# Patient Record
Sex: FEMALE | Race: BLACK OR AFRICAN AMERICAN | NOT HISPANIC OR LATINO
[De-identification: names, ages, dates, MRNs, and addresses within clinical notes are randomized per-mention and may not be internally consistent; named-entity substitution may affect disease eponyms.]

---

## 2017-01-20 ENCOUNTER — MED ADMIN CHARGE (OUTPATIENT)
Age: 27
End: 2017-01-20

## 2017-01-20 ENCOUNTER — APPOINTMENT (OUTPATIENT)
Dept: OBGYN | Facility: CLINIC | Age: 27
End: 2017-01-20

## 2017-01-20 VITALS — WEIGHT: 198 LBS | SYSTOLIC BLOOD PRESSURE: 110 MMHG | DIASTOLIC BLOOD PRESSURE: 66 MMHG

## 2017-01-20 RX ORDER — MEDROXYPROGESTERONE ACETATE 150 MG/ML
150 INJECTION, SUSPENSION INTRAMUSCULAR
Qty: 0 | Refills: 0 | Status: COMPLETED | OUTPATIENT
Start: 2017-01-20

## 2017-01-20 RX ADMIN — MEDROXYPROGESTERONE ACETATE 0 MG/ML: 150 INJECTION, SUSPENSION INTRAMUSCULAR at 00:00

## 2017-04-07 ENCOUNTER — RECORD ABSTRACTING (OUTPATIENT)
Age: 27
End: 2017-04-07

## 2017-04-10 ENCOUNTER — RECORD ABSTRACTING (OUTPATIENT)
Age: 27
End: 2017-04-10

## 2017-04-14 ENCOUNTER — APPOINTMENT (OUTPATIENT)
Dept: OBGYN | Facility: CLINIC | Age: 27
End: 2017-04-14

## 2017-04-14 VITALS
HEIGHT: 62 IN | WEIGHT: 191 LBS | DIASTOLIC BLOOD PRESSURE: 62 MMHG | SYSTOLIC BLOOD PRESSURE: 110 MMHG | BODY MASS INDEX: 35.15 KG/M2

## 2017-04-14 RX ORDER — MEDROXYPROGESTERONE ACETATE 150 MG/ML
150 INJECTION, SUSPENSION INTRAMUSCULAR
Qty: 0 | Refills: 0 | Status: COMPLETED | OUTPATIENT
Start: 2017-04-14

## 2017-04-14 RX ADMIN — MEDROXYPROGESTERONE ACETATE 0 MG/ML: 150 INJECTION, SUSPENSION INTRAMUSCULAR at 00:00

## 2017-04-21 ENCOUNTER — APPOINTMENT (OUTPATIENT)
Dept: INTERNAL MEDICINE | Facility: CLINIC | Age: 27
End: 2017-04-21

## 2017-05-03 ENCOUNTER — APPOINTMENT (OUTPATIENT)
Dept: INTERNAL MEDICINE | Facility: CLINIC | Age: 27
End: 2017-05-03

## 2017-05-03 ENCOUNTER — OUTPATIENT (OUTPATIENT)
Dept: OUTPATIENT SERVICES | Facility: HOSPITAL | Age: 27
LOS: 1 days | Discharge: HOME | End: 2017-05-03

## 2017-05-03 ENCOUNTER — RESULT REVIEW (OUTPATIENT)
Age: 27
End: 2017-05-03

## 2017-05-03 VITALS
HEIGHT: 62 IN | WEIGHT: 190 LBS | SYSTOLIC BLOOD PRESSURE: 122 MMHG | HEART RATE: 105 BPM | BODY MASS INDEX: 34.96 KG/M2 | DIASTOLIC BLOOD PRESSURE: 86 MMHG

## 2017-05-04 LAB
ALBUMIN SERPL-MCNC: 3.8 G/DL
ALBUMIN/GLOB SERPL: 1.09
ALP SERPL-CCNC: 53 IU/L
ALT SERPL-CCNC: 18 IU/L
ANION GAP SERPL CALC-SCNC: 10 MEQ/L
AST SERPL-CCNC: 29 IU/L
BASOPHILS # BLD: 0.02 TH/MM3
BASOPHILS NFR BLD: 0.2 %
BILIRUB SERPL-MCNC: 0.4 MG/DL
BUN SERPL-MCNC: 7 MG/DL
BUN/CREAT SERPL: 10.3 %
CALCIUM SERPL-MCNC: 9.3 MG/DL
CHLORIDE SERPL-SCNC: 105 MEQ/L
CO2 SERPL-SCNC: 25 MEQ/L
CREAT SERPL-MCNC: 0.68 MG/DL
DIFFERENTIAL METHOD BLD: NORMAL
EOSINOPHIL # BLD: 0.36 TH/MM3
EOSINOPHIL NFR BLD: 3.4 %
ERYTHROCYTE [DISTWIDTH] IN BLOOD BY AUTOMATED COUNT: 14.9 %
GFR SERPL CREATININE-BSD FRML MDRD: 128
GLUCOSE SERPL-MCNC: 82 MG/DL
GRANULOCYTES # BLD: 7.01 TH/MM3
GRANULOCYTES NFR BLD: 66.7 %
HCT VFR BLD AUTO: 38.4 %
HGB BLD-MCNC: 12.3 G/DL
IMM GRANULOCYTES # BLD: 0.03 TH/MM3
IMM GRANULOCYTES NFR BLD: 0.3 %
LYMPHOCYTES # BLD: 2.35 TH/MM3
LYMPHOCYTES NFR BLD: 22.3 %
MCH RBC QN AUTO: 25.2 PG
MCHC RBC AUTO-ENTMCNC: 32 G/DL
MCV RBC AUTO: 78.5 FL
MONOCYTES # BLD: 0.75 TH/MM3
MONOCYTES NFR BLD: 7.1 %
PLATELET # BLD: 453 TH/MM3
PMV BLD AUTO: 10.9 FL
POTASSIUM SERPL-SCNC: 3.8 MMOL/L
PROT SERPL-MCNC: 7.3 G/DL
RBC # BLD AUTO: 4.89 MIL/MM3
SODIUM SERPL-SCNC: 140 MEQ/L
WBC # BLD: 10.52 TH/MM3

## 2017-06-28 DIAGNOSIS — J45.909 UNSPECIFIED ASTHMA, UNCOMPLICATED: ICD-10-CM

## 2017-06-28 DIAGNOSIS — J34.9 UNSPECIFIED DISORDER OF NOSE AND NASAL SINUSES: ICD-10-CM

## 2017-06-30 ENCOUNTER — MED ADMIN CHARGE (OUTPATIENT)
Age: 27
End: 2017-06-30

## 2017-06-30 ENCOUNTER — APPOINTMENT (OUTPATIENT)
Dept: OBGYN | Facility: CLINIC | Age: 27
End: 2017-06-30

## 2017-06-30 ENCOUNTER — OUTPATIENT (OUTPATIENT)
Dept: OUTPATIENT SERVICES | Facility: HOSPITAL | Age: 27
LOS: 1 days | Discharge: HOME | End: 2017-06-30

## 2017-06-30 VITALS
BODY MASS INDEX: 34.23 KG/M2 | WEIGHT: 186 LBS | SYSTOLIC BLOOD PRESSURE: 120 MMHG | DIASTOLIC BLOOD PRESSURE: 70 MMHG | HEIGHT: 62 IN

## 2017-06-30 RX ORDER — MEDROXYPROGESTERONE ACETATE 150 MG/ML
150 INJECTION, SUSPENSION INTRAMUSCULAR
Qty: 0 | Refills: 0 | Status: COMPLETED | OUTPATIENT
Start: 2017-06-30

## 2017-06-30 RX ADMIN — Medication 0 MG/ML: at 00:00

## 2017-09-22 ENCOUNTER — OUTPATIENT (OUTPATIENT)
Dept: OUTPATIENT SERVICES | Facility: HOSPITAL | Age: 27
LOS: 1 days | Discharge: HOME | End: 2017-09-22

## 2017-09-22 ENCOUNTER — APPOINTMENT (OUTPATIENT)
Dept: OBGYN | Facility: CLINIC | Age: 27
End: 2017-09-22

## 2017-09-22 VITALS — WEIGHT: 197 LBS | BODY MASS INDEX: 36.03 KG/M2 | SYSTOLIC BLOOD PRESSURE: 118 MMHG | DIASTOLIC BLOOD PRESSURE: 70 MMHG

## 2017-09-22 RX ORDER — MEDROXYPROGESTERONE ACETATE 150 MG/ML
150 INJECTION, SUSPENSION INTRAMUSCULAR
Qty: 0 | Refills: 0 | Status: COMPLETED | OUTPATIENT
Start: 2017-09-22

## 2017-09-22 RX ADMIN — MEDROXYPROGESTERONE ACETATE 0 MG/ML: 150 INJECTION, SUSPENSION INTRAMUSCULAR at 00:00

## 2017-09-28 LAB
C TRACH RRNA SPEC QL NAA+PROBE: NOT DETECTED
N GONORRHOEA RRNA SPEC QL NAA+PROBE: NOT DETECTED

## 2017-10-24 LAB — CYTOLOGY CVX/VAG DOC THIN PREP: NORMAL

## 2017-11-01 ENCOUNTER — APPOINTMENT (OUTPATIENT)
Dept: INTERNAL MEDICINE | Facility: CLINIC | Age: 27
End: 2017-11-01

## 2017-11-07 ENCOUNTER — OUTPATIENT (OUTPATIENT)
Dept: OUTPATIENT SERVICES | Facility: HOSPITAL | Age: 27
LOS: 1 days | Discharge: HOME | End: 2017-11-07

## 2017-11-07 ENCOUNTER — APPOINTMENT (OUTPATIENT)
Dept: INTERNAL MEDICINE | Facility: CLINIC | Age: 27
End: 2017-11-07

## 2017-11-07 VITALS
BODY MASS INDEX: 36.21 KG/M2 | HEART RATE: 88 BPM | WEIGHT: 198 LBS | DIASTOLIC BLOOD PRESSURE: 78 MMHG | SYSTOLIC BLOOD PRESSURE: 114 MMHG

## 2017-12-08 ENCOUNTER — APPOINTMENT (OUTPATIENT)
Dept: OBGYN | Facility: CLINIC | Age: 27
End: 2017-12-08

## 2017-12-08 ENCOUNTER — OUTPATIENT (OUTPATIENT)
Dept: OUTPATIENT SERVICES | Facility: HOSPITAL | Age: 27
LOS: 1 days | Discharge: HOME | End: 2017-12-08

## 2017-12-08 VITALS
SYSTOLIC BLOOD PRESSURE: 120 MMHG | BODY MASS INDEX: 34.97 KG/M2 | WEIGHT: 190.03 LBS | DIASTOLIC BLOOD PRESSURE: 72 MMHG | HEIGHT: 62 IN

## 2017-12-08 RX ADMIN — MEDROXYPROGESTERONE ACETATE 0 MG/ML: 150 INJECTION, SUSPENSION INTRAMUSCULAR at 00:00

## 2017-12-28 ENCOUNTER — APPOINTMENT (OUTPATIENT)
Dept: INTERNAL MEDICINE | Facility: CLINIC | Age: 27
End: 2017-12-28

## 2017-12-28 ENCOUNTER — OUTPATIENT (OUTPATIENT)
Dept: OUTPATIENT SERVICES | Facility: HOSPITAL | Age: 27
LOS: 1 days | Discharge: HOME | End: 2017-12-28

## 2017-12-28 RX ORDER — CHROMIUM 200 MCG
TABLET ORAL
Refills: 0 | Status: DISCONTINUED | COMMUNITY
End: 2017-12-28

## 2017-12-29 ENCOUNTER — APPOINTMENT (OUTPATIENT)
Dept: INTERNAL MEDICINE | Facility: CLINIC | Age: 27
End: 2017-12-29

## 2017-12-29 ENCOUNTER — OUTPATIENT (OUTPATIENT)
Dept: OUTPATIENT SERVICES | Facility: HOSPITAL | Age: 27
LOS: 1 days | Discharge: HOME | End: 2017-12-29

## 2017-12-29 LAB
BASOPHILS # BLD: 0.04 TH/MM3
BASOPHILS NFR BLD: 0.5 %
DIFFERENTIAL METHOD BLD: NORMAL
EOSINOPHIL # BLD: 0.28 TH/MM3
EOSINOPHIL NFR BLD: 3.2 %
ERYTHROCYTE [DISTWIDTH] IN BLOOD BY AUTOMATED COUNT: 14.1 %
GRANULOCYTES # BLD: 4.68 TH/MM3
GRANULOCYTES NFR BLD: 53.6 %
HCT VFR BLD AUTO: 38.5 %
HGB BLD-MCNC: 12.6 G/DL
IMM GRANULOCYTES # BLD: 0.02 TH/MM3
IMM GRANULOCYTES NFR BLD: 0.2 %
LYMPHOCYTES # BLD: 3.11 TH/MM3
LYMPHOCYTES NFR BLD: 35.7 %
MCH RBC QN AUTO: 25.8 PG
MCHC RBC AUTO-ENTMCNC: 32.7 G/DL
MCV RBC AUTO: 78.7 FL
MONOCYTES # BLD: 0.59 TH/MM3
MONOCYTES NFR BLD: 6.8 %
PLATELET # BLD: 425 TH/MM3
PMV BLD AUTO: 11.1 FL
RBC # BLD AUTO: 4.89 MIL/MM3
WBC # BLD: 8.72 TH/MM3

## 2018-01-02 DIAGNOSIS — Z23 ENCOUNTER FOR IMMUNIZATION: ICD-10-CM

## 2018-01-02 LAB
25(OH)D3 SERPL-MCNC: 8 NG/ML
GAMMA INTERFERON BACKGROUND BLD IA-ACNC: 0.01 IU/ML
M TB IFN-G BLD-IMP: NEGATIVE
M TB IFN-G CD4+ T-CELLS BLD-ACNC: 0 IU/ML
MITOGEN IGNF BLD-ACNC: 9.01 IU/ML
VITAMIN D2 SERPL-MCNC: <4 NG/ML
VITAMIN D3 SERPL-MCNC: 8 NG/ML

## 2018-01-03 ENCOUNTER — APPOINTMENT (OUTPATIENT)
Dept: INTERNAL MEDICINE | Facility: CLINIC | Age: 28
End: 2018-01-03

## 2018-01-23 ENCOUNTER — OTHER (OUTPATIENT)
Age: 28
End: 2018-01-23

## 2018-01-25 ENCOUNTER — OTHER (OUTPATIENT)
Age: 28
End: 2018-01-25

## 2018-02-11 ENCOUNTER — EMERGENCY (EMERGENCY)
Facility: HOSPITAL | Age: 28
LOS: 0 days | Discharge: HOME | End: 2018-02-11
Admitting: INTERNAL MEDICINE

## 2018-02-11 VITALS
HEART RATE: 119 BPM | OXYGEN SATURATION: 100 % | RESPIRATION RATE: 18 BRPM | TEMPERATURE: 99 F | DIASTOLIC BLOOD PRESSURE: 96 MMHG | SYSTOLIC BLOOD PRESSURE: 136 MMHG

## 2018-02-11 DIAGNOSIS — R09.81 NASAL CONGESTION: ICD-10-CM

## 2018-02-11 DIAGNOSIS — J06.9 ACUTE UPPER RESPIRATORY INFECTION, UNSPECIFIED: ICD-10-CM

## 2018-02-11 DIAGNOSIS — J45.909 UNSPECIFIED ASTHMA, UNCOMPLICATED: ICD-10-CM

## 2018-02-11 DIAGNOSIS — M79.1 MYALGIA: ICD-10-CM

## 2018-02-11 RX ORDER — IBUPROFEN 200 MG
600 TABLET ORAL ONCE
Qty: 0 | Refills: 0 | Status: COMPLETED | OUTPATIENT
Start: 2018-02-11 | End: 2018-02-11

## 2018-02-11 RX ADMIN — Medication 600 MILLIGRAM(S): at 11:38

## 2018-02-11 NOTE — ED PROVIDER NOTE - ENMT NEGATIVE STATEMENT, MLM
Ears: no ear pain and no hearing problems. Nose: + nasal congestion and nasal drainage. Mouth/Throat: no dysphagia, no hoarseness and no throat pain. Neck: no lumps, no pain, no stiffness and no swollen glands.

## 2018-02-11 NOTE — ED PROVIDER NOTE - OBJECTIVE STATEMENT
27 y/o F, PMHx Asthma, presents to the ED with complaints of nasal congestion and body-aches x four days. She states that her boyfriend was recently ill with similar symptoms. She denies odynophagia, chest pain, dyspnea, cough, nausea, vomiting, abdominal pain, back pain and recent travel. She has been taking Nyquil with moderate symptomatic improvement ; she last took yesterday.

## 2018-02-11 NOTE — ED PROVIDER NOTE - ENMT, MLM
Airway patent, Nasal turbinates inflammed b/l. Mouth with normal mucosa. Throat has no vesicles, no oropharyngeal exudates and uvula is midline.

## 2018-02-11 NOTE — ED PROVIDER NOTE - MEDICAL DECISION MAKING DETAILS
Patient advised that symptoms are likely attributable to a viral etiology. Advised on sx's prompting ED return and patient may return to the ED at any time

## 2018-03-02 ENCOUNTER — APPOINTMENT (OUTPATIENT)
Dept: OBGYN | Facility: CLINIC | Age: 28
End: 2018-03-02

## 2018-03-02 ENCOUNTER — OUTPATIENT (OUTPATIENT)
Dept: OUTPATIENT SERVICES | Facility: HOSPITAL | Age: 28
LOS: 1 days | Discharge: HOME | End: 2018-03-02

## 2018-03-02 VITALS
WEIGHT: 193.04 LBS | SYSTOLIC BLOOD PRESSURE: 118 MMHG | BODY MASS INDEX: 35.52 KG/M2 | HEIGHT: 62 IN | DIASTOLIC BLOOD PRESSURE: 64 MMHG

## 2018-03-02 RX ORDER — MEDROXYPROGESTERONE ACETATE 150 MG/ML
150 INJECTION, SUSPENSION INTRAMUSCULAR
Qty: 0 | Refills: 0 | Status: COMPLETED | OUTPATIENT
Start: 2018-03-02

## 2018-03-02 RX ADMIN — MEDROXYPROGESTERONE ACETATE 0 MG/ML: 150 INJECTION, SUSPENSION INTRAMUSCULAR at 00:00

## 2018-03-05 DIAGNOSIS — Z30.8 ENCOUNTER FOR OTHER CONTRACEPTIVE MANAGEMENT: ICD-10-CM

## 2018-04-03 ENCOUNTER — APPOINTMENT (OUTPATIENT)
Dept: DERMATOLOGY | Facility: CLINIC | Age: 28
End: 2018-04-03

## 2018-04-03 ENCOUNTER — OUTPATIENT (OUTPATIENT)
Dept: OUTPATIENT SERVICES | Facility: HOSPITAL | Age: 28
LOS: 1 days | Discharge: HOME | End: 2018-04-03

## 2018-05-02 ENCOUNTER — OUTPATIENT (OUTPATIENT)
Dept: OUTPATIENT SERVICES | Facility: HOSPITAL | Age: 28
LOS: 1 days | Discharge: HOME | End: 2018-05-02

## 2018-05-02 ENCOUNTER — APPOINTMENT (OUTPATIENT)
Dept: INTERNAL MEDICINE | Facility: CLINIC | Age: 28
End: 2018-05-02

## 2018-05-02 VITALS
BODY MASS INDEX: 36.44 KG/M2 | HEIGHT: 62 IN | DIASTOLIC BLOOD PRESSURE: 88 MMHG | SYSTOLIC BLOOD PRESSURE: 126 MMHG | WEIGHT: 198 LBS | HEART RATE: 80 BPM

## 2018-05-02 DIAGNOSIS — E55.9 VITAMIN D DEFICIENCY, UNSPECIFIED: ICD-10-CM

## 2018-05-02 DIAGNOSIS — F41.9 ANXIETY DISORDER, UNSPECIFIED: ICD-10-CM

## 2018-05-25 ENCOUNTER — OUTPATIENT (OUTPATIENT)
Dept: OUTPATIENT SERVICES | Facility: HOSPITAL | Age: 28
LOS: 1 days | Discharge: HOME | End: 2018-05-25

## 2018-05-25 ENCOUNTER — APPOINTMENT (OUTPATIENT)
Dept: OBGYN | Facility: CLINIC | Age: 28
End: 2018-05-25

## 2018-05-25 VITALS
BODY MASS INDEX: 36.91 KG/M2 | SYSTOLIC BLOOD PRESSURE: 122 MMHG | HEIGHT: 62 IN | WEIGHT: 200.56 LBS | DIASTOLIC BLOOD PRESSURE: 86 MMHG

## 2018-05-29 ENCOUNTER — EMERGENCY (EMERGENCY)
Facility: HOSPITAL | Age: 28
LOS: 0 days | Discharge: HOME | End: 2018-05-29
Admitting: INTERNAL MEDICINE

## 2018-05-29 VITALS
RESPIRATION RATE: 18 BRPM | DIASTOLIC BLOOD PRESSURE: 79 MMHG | TEMPERATURE: 98 F | HEART RATE: 107 BPM | SYSTOLIC BLOOD PRESSURE: 124 MMHG | OXYGEN SATURATION: 98 %

## 2018-05-29 VITALS — HEART RATE: 81 BPM

## 2018-05-29 DIAGNOSIS — M79.675 PAIN IN LEFT TOE(S): ICD-10-CM

## 2018-05-29 DIAGNOSIS — Z30.8 ENCOUNTER FOR OTHER CONTRACEPTIVE MANAGEMENT: ICD-10-CM

## 2018-05-29 DIAGNOSIS — S91.205A UNSPECIFIED OPEN WOUND OF LEFT LESSER TOE(S) WITH DAMAGE TO NAIL, INITIAL ENCOUNTER: ICD-10-CM
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
## 2018-05-29 LAB
25(OH)D3 SERPL-MCNC: 9 NG/ML
BASOPHILS # BLD AUTO: 0.03 K/UL
BASOPHILS NFR BLD AUTO: 0.3 %
EOSINOPHIL # BLD AUTO: 0.29 K/UL
EOSINOPHIL NFR BLD AUTO: 3.4 %
HCT VFR BLD CALC: 37.6 %
HGB BLD-MCNC: 11.9 G/DL
IMM GRANULOCYTES NFR BLD AUTO: 0.2 %
LYMPHOCYTES # BLD AUTO: 2.79 K/UL
LYMPHOCYTES NFR BLD AUTO: 32.5 %
MAN DIFF?: NORMAL
MCHC RBC-ENTMCNC: 25 PG
MCHC RBC-ENTMCNC: 31.6 G/DL
MCV RBC AUTO: 79 FL
MONOCYTES # BLD AUTO: 0.9 K/UL
MONOCYTES NFR BLD AUTO: 10.5 %
NEUTROPHILS # BLD AUTO: 4.55 K/UL
NEUTROPHILS NFR BLD AUTO: 53.1 %
PLATELET # BLD AUTO: 465 K/UL
RBC # BLD: 4.76 M/UL
RBC # FLD: 14 %
WBC # FLD AUTO: 8.58 K/UL

## 2018-05-29 NOTE — ED PROVIDER NOTE - OBJECTIVE STATEMENT
left 5th toe pain from cracked nail that occurred when jagged nail edge caught on blanket and ripped piece of nail off. Now with pain and is concerned about potential infx. Denies fever or hx of DM

## 2018-05-29 NOTE — ED PROVIDER NOTE - PHYSICAL EXAMINATION
left foot: 5th toe with cracked and missing lateral portion of nail all the way down past eponychial fold. No redenss, swelling , dc or streaking or bleeding. FROM is intact

## 2018-05-29 NOTE — ED PROVIDER NOTE - NS ED ROS FT
CONST: No fever, chills or bodyaches  EYES: No pain, redness, drainage or visual changes.    SKIN: No rashes  NEURO: No headache, dizziness, paresthesias or LOC

## 2018-07-10 ENCOUNTER — OUTPATIENT (OUTPATIENT)
Dept: OUTPATIENT SERVICES | Facility: HOSPITAL | Age: 28
LOS: 1 days | Discharge: HOME | End: 2018-07-10

## 2018-07-10 ENCOUNTER — APPOINTMENT (OUTPATIENT)
Dept: DERMATOLOGY | Facility: CLINIC | Age: 28
End: 2018-07-10

## 2018-07-13 ENCOUNTER — APPOINTMENT (OUTPATIENT)
Dept: OTOLARYNGOLOGY | Facility: CLINIC | Age: 28
End: 2018-07-13

## 2018-07-17 ENCOUNTER — RX RENEWAL (OUTPATIENT)
Age: 28
End: 2018-07-17

## 2018-07-17 RX ORDER — BETAMETHASONE VALERATE 1 MG/ML
0.1 LOTION CUTANEOUS DAILY
Qty: 1 | Refills: 5 | Status: DISCONTINUED | COMMUNITY
Start: 2018-07-10 | End: 2018-07-17

## 2018-07-30 ENCOUNTER — APPOINTMENT (OUTPATIENT)
Dept: INTERNAL MEDICINE | Facility: CLINIC | Age: 28
End: 2018-07-30

## 2018-08-10 ENCOUNTER — OUTPATIENT (OUTPATIENT)
Dept: OUTPATIENT SERVICES | Facility: HOSPITAL | Age: 28
LOS: 1 days | Discharge: HOME | End: 2018-08-10

## 2018-08-10 ENCOUNTER — APPOINTMENT (OUTPATIENT)
Dept: OBGYN | Facility: CLINIC | Age: 28
End: 2018-08-10

## 2018-08-10 VITALS
DIASTOLIC BLOOD PRESSURE: 80 MMHG | BODY MASS INDEX: 36.64 KG/M2 | SYSTOLIC BLOOD PRESSURE: 110 MMHG | WEIGHT: 199.13 LBS | HEIGHT: 62 IN

## 2018-08-13 DIAGNOSIS — Z30.8 ENCOUNTER FOR OTHER CONTRACEPTIVE MANAGEMENT: ICD-10-CM

## 2018-09-02 ENCOUNTER — EMERGENCY (EMERGENCY)
Facility: HOSPITAL | Age: 28
LOS: 0 days | Discharge: HOME | End: 2018-09-02
Admitting: PHYSICIAN ASSISTANT

## 2018-09-02 VITALS
SYSTOLIC BLOOD PRESSURE: 124 MMHG | HEART RATE: 110 BPM | TEMPERATURE: 99 F | OXYGEN SATURATION: 98 % | DIASTOLIC BLOOD PRESSURE: 80 MMHG | RESPIRATION RATE: 18 BRPM

## 2018-09-02 VITALS — HEART RATE: 90 BPM

## 2018-09-02 DIAGNOSIS — Z91.048 OTHER NONMEDICINAL SUBSTANCE ALLERGY STATUS: ICD-10-CM

## 2018-09-02 DIAGNOSIS — R21 RASH AND OTHER NONSPECIFIC SKIN ERUPTION: ICD-10-CM

## 2018-09-02 RX ORDER — MOMETASONE FUROATE 1 MG/G
0 CREAM TOPICAL
Qty: 0 | Refills: 0 | COMMUNITY

## 2018-09-02 RX ORDER — TACROLIMUS/VEHICLE BASE NO.238 0.1 %
1 CREAM (GRAM) TOPICAL
Qty: 0 | Refills: 0 | COMMUNITY

## 2018-09-02 NOTE — ED PROVIDER NOTE - OBJECTIVE STATEMENT
29 y/o F, PMHx Asthma, presents to the ED with complaints of a rash x one week. Patient states that she has hx of similar rash that has been intermittent over the past several years. She has seen a dermatologist and was prescribed mometasone cream which she has however has not yet used for most recent rash. She denies any dyspnea, chest pain, nausea, vomiting, fever and chills.

## 2018-09-02 NOTE — ED ADULT NURSE NOTE - OBJECTIVE STATEMENT
Patient complains of rash to bilateral underarms, complains of "burning" to area, states she is seeing dermatologist outpatient and has prescription medication but was supposed to work today; cannot due to burning of underarms, also requesting work note

## 2018-09-13 ENCOUNTER — APPOINTMENT (OUTPATIENT)
Dept: OTOLARYNGOLOGY | Facility: CLINIC | Age: 28
End: 2018-09-13

## 2018-10-05 ENCOUNTER — APPOINTMENT (OUTPATIENT)
Dept: OBGYN | Facility: CLINIC | Age: 28
End: 2018-10-05

## 2018-11-06 ENCOUNTER — APPOINTMENT (OUTPATIENT)
Dept: DERMATOLOGY | Facility: CLINIC | Age: 28
End: 2018-11-06

## 2018-11-06 ENCOUNTER — OUTPATIENT (OUTPATIENT)
Dept: OUTPATIENT SERVICES | Facility: HOSPITAL | Age: 28
LOS: 1 days | Discharge: HOME | End: 2018-11-06

## 2018-11-06 PROBLEM — J45.909 UNSPECIFIED ASTHMA, UNCOMPLICATED: Chronic | Status: ACTIVE | Noted: 2018-09-02

## 2018-11-06 PROBLEM — R21 RASH AND OTHER NONSPECIFIC SKIN ERUPTION: Chronic | Status: ACTIVE | Noted: 2018-09-02

## 2018-11-06 PROBLEM — F41.9 ANXIETY DISORDER, UNSPECIFIED: Chronic | Status: ACTIVE | Noted: 2018-09-02

## 2018-11-09 ENCOUNTER — APPOINTMENT (OUTPATIENT)
Dept: OBGYN | Facility: CLINIC | Age: 28
End: 2018-11-09

## 2018-11-09 ENCOUNTER — OUTPATIENT (OUTPATIENT)
Dept: OUTPATIENT SERVICES | Facility: HOSPITAL | Age: 28
LOS: 1 days | Discharge: HOME | End: 2018-11-09

## 2018-11-09 VITALS
WEIGHT: 198 LBS | HEIGHT: 62 IN | DIASTOLIC BLOOD PRESSURE: 70 MMHG | BODY MASS INDEX: 36.44 KG/M2 | SYSTOLIC BLOOD PRESSURE: 130 MMHG

## 2018-11-09 DIAGNOSIS — Z82.5 FAMILY HISTORY OF ASTHMA AND OTHER CHRONIC LOWER RESPIRATORY DISEASES: ICD-10-CM

## 2018-11-10 DIAGNOSIS — Z01.419 ENCOUNTER FOR GYNECOLOGICAL EXAMINATION (GENERAL) (ROUTINE) WITHOUT ABNORMAL FINDINGS: ICD-10-CM

## 2018-11-16 DIAGNOSIS — Z30.42 ENCOUNTER FOR SURVEILLANCE OF INJECTABLE CONTRACEPTIVE: ICD-10-CM

## 2018-11-16 DIAGNOSIS — R39.15 URGENCY OF URINATION: ICD-10-CM

## 2018-11-18 LAB
A VAGINAE DNA VAG QL NAA+PROBE: NORMAL
BVAB2 DNA VAG QL NAA+PROBE: NORMAL
C KRUSEI DNA VAG QL NAA+PROBE: NEGATIVE
C TRACH RRNA SPEC QL NAA+PROBE: NEGATIVE
MEGA1 DNA VAG QL NAA+PROBE: NORMAL
N GONORRHOEA RRNA SPEC QL NAA+PROBE: NEGATIVE
T VAGINALIS RRNA SPEC QL NAA+PROBE: NEGATIVE

## 2018-11-28 ENCOUNTER — APPOINTMENT (OUTPATIENT)
Dept: INTERNAL MEDICINE | Facility: CLINIC | Age: 28
End: 2018-11-28

## 2018-11-28 ENCOUNTER — OUTPATIENT (OUTPATIENT)
Dept: OUTPATIENT SERVICES | Facility: HOSPITAL | Age: 28
LOS: 1 days | Discharge: HOME | End: 2018-11-28

## 2018-11-28 ENCOUNTER — LABORATORY RESULT (OUTPATIENT)
Age: 28
End: 2018-11-28

## 2018-11-28 VITALS
WEIGHT: 196.31 LBS | HEART RATE: 101 BPM | TEMPERATURE: 97.7 F | BODY MASS INDEX: 36.12 KG/M2 | DIASTOLIC BLOOD PRESSURE: 84 MMHG | SYSTOLIC BLOOD PRESSURE: 130 MMHG | HEIGHT: 62 IN

## 2018-11-28 DIAGNOSIS — Z87.09 PERSONAL HISTORY OF OTHER DISEASES OF THE RESPIRATORY SYSTEM: ICD-10-CM

## 2018-11-28 RX ORDER — CHROMIUM 200 MCG
1000 TABLET ORAL DAILY
Qty: 30 | Refills: 5 | Status: DISCONTINUED | COMMUNITY
Start: 2017-12-28 | End: 2018-11-28

## 2018-11-28 RX ORDER — POLYETHYLENE GLYCOL 3350 17 G/17G
17 POWDER, FOR SOLUTION ORAL DAILY
Qty: 1 | Refills: 3 | Status: DISCONTINUED | COMMUNITY
Start: 2017-05-03 | End: 2018-11-28
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
## 2018-11-28 NOTE — ASSESSMENT
[FreeTextEntry1] : 28F with PMHx of asthma and sickle cell trait here for follow up visit. \par \par #Mild persistent Asthma \par - takes proventil PRN - refilled\par - has been taking several times per week, symptoms are worse for her in the winter \par - will advise to start symbicort \par - will refer to pulmonology and check PFT \par \par # Anxiety\par - follows with psychiatry\par - advised to bring in her medications at next visit \par \par #contact dermatitis \par - resolved\par \par # Vit D deficiency\par - still has low vitamin D \par - c/w supplementation - will refill \par \par # HCM\par - Pap smear uptodate\par - on birth control as well\par - flu shot to give today \par \par \par

## 2018-11-29 DIAGNOSIS — F41.9 ANXIETY DISORDER, UNSPECIFIED: ICD-10-CM

## 2018-11-29 DIAGNOSIS — Z23 ENCOUNTER FOR IMMUNIZATION: ICD-10-CM

## 2018-11-29 DIAGNOSIS — E55.9 VITAMIN D DEFICIENCY, UNSPECIFIED: ICD-10-CM

## 2018-11-29 DIAGNOSIS — J45.909 UNSPECIFIED ASTHMA, UNCOMPLICATED: ICD-10-CM

## 2018-11-29 LAB
25(OH)D3 SERPL-MCNC: 9 NG/ML
ALBUMIN SERPL ELPH-MCNC: 4.2 G/DL
ALP BLD-CCNC: 58 U/L
ALT SERPL-CCNC: 10 U/L
ANION GAP SERPL CALC-SCNC: 15 MMOL/L
AST SERPL-CCNC: 11 U/L
BASOPHILS # BLD AUTO: 0.03 K/UL
BASOPHILS NFR BLD AUTO: 0.4 %
BILIRUB SERPL-MCNC: <0.2 MG/DL
BUN SERPL-MCNC: 8 MG/DL
CALCIUM SERPL-MCNC: 8.9 MG/DL
CHLORIDE SERPL-SCNC: 109 MMOL/L
CHOLEST SERPL-MCNC: 137 MG/DL
CHOLEST/HDLC SERPL: 3.2 RATIO
CO2 SERPL-SCNC: 20 MMOL/L
CREAT SERPL-MCNC: 0.7 MG/DL
EOSINOPHIL # BLD AUTO: 0.2 K/UL
EOSINOPHIL NFR BLD AUTO: 2.5 %
GLUCOSE SERPL-MCNC: 96 MG/DL
HCT VFR BLD CALC: 37.9 %
HDLC SERPL-MCNC: 43 MG/DL
HGB BLD-MCNC: 11.9 G/DL
IMM GRANULOCYTES NFR BLD AUTO: 0.3 %
LDLC SERPL CALC-MCNC: 84 MG/DL
LYMPHOCYTES # BLD AUTO: 2.31 K/UL
LYMPHOCYTES NFR BLD AUTO: 29.1 %
MAN DIFF?: NORMAL
MCHC RBC-ENTMCNC: 24.7 PG
MCHC RBC-ENTMCNC: 31.4 G/DL
MCV RBC AUTO: 78.6 FL
MONOCYTES # BLD AUTO: 0.69 K/UL
MONOCYTES NFR BLD AUTO: 8.7 %
NEUTROPHILS # BLD AUTO: 4.69 K/UL
NEUTROPHILS NFR BLD AUTO: 59 %
PLATELET # BLD AUTO: 493 K/UL
POTASSIUM SERPL-SCNC: 4.2 MMOL/L
PROT SERPL-MCNC: 7.5 G/DL
RBC # BLD: 4.82 M/UL
RBC # FLD: 14.4 %
SODIUM SERPL-SCNC: 144 MMOL/L
TRIGL SERPL-MCNC: 62 MG/DL
TSH SERPL-ACNC: 2.02 UIU/ML
WBC # FLD AUTO: 7.94 K/UL

## 2018-12-03 LAB
APPEARANCE: CLEAR
BACTERIA UR CULT: NORMAL
BILIRUBIN URINE: NEGATIVE
BLOOD URINE: ABNORMAL
COLOR: YELLOW
GLUCOSE QUALITATIVE U: NEGATIVE MG/DL
KETONES URINE: NEGATIVE
LEUKOCYTE ESTERASE URINE: NEGATIVE
NITRITE URINE: NEGATIVE
PH URINE: 6.5
PROTEIN URINE: NEGATIVE MG/DL
SPECIFIC GRAVITY URINE: 1.02
UROBILINOGEN URINE: 0.2 MG/DL (ref 0.2–?)

## 2019-01-04 ENCOUNTER — RESULT CHARGE (OUTPATIENT)
Age: 29
End: 2019-01-04

## 2019-01-04 ENCOUNTER — APPOINTMENT (OUTPATIENT)
Dept: UROGYNECOLOGY | Facility: CLINIC | Age: 29
End: 2019-01-04

## 2019-01-04 ENCOUNTER — OUTPATIENT (OUTPATIENT)
Dept: OUTPATIENT SERVICES | Facility: HOSPITAL | Age: 29
LOS: 1 days | Discharge: HOME | End: 2019-01-04

## 2019-01-04 VITALS
DIASTOLIC BLOOD PRESSURE: 84 MMHG | HEIGHT: 62 IN | WEIGHT: 198 LBS | SYSTOLIC BLOOD PRESSURE: 132 MMHG | BODY MASS INDEX: 36.44 KG/M2

## 2019-01-04 DIAGNOSIS — Z87.19 PERSONAL HISTORY OF OTHER DISEASES OF THE DIGESTIVE SYSTEM: ICD-10-CM

## 2019-01-04 DIAGNOSIS — Z87.42 PERSONAL HISTORY OF OTHER DISEASES OF THE FEMALE GENITAL TRACT: ICD-10-CM

## 2019-01-04 DIAGNOSIS — N31.8 OTHER NEUROMUSCULAR DYSFUNCTION OF BLADDER: ICD-10-CM

## 2019-01-04 DIAGNOSIS — N39.46 MIXED INCONTINENCE: ICD-10-CM

## 2019-01-04 DIAGNOSIS — M79.10 MYALGIA, UNSPECIFIED SITE: ICD-10-CM

## 2019-01-04 DIAGNOSIS — R35.0 FREQUENCY OF MICTURITION: ICD-10-CM

## 2019-01-04 DIAGNOSIS — K59.00 CONSTIPATION, UNSPECIFIED: ICD-10-CM

## 2019-01-04 LAB
BILIRUB UR QL STRIP: NORMAL
CLARITY UR: CLEAR
COLLECTION METHOD: NORMAL
GLUCOSE UR-MCNC: NORMAL
HCG UR QL: 0.2 EU/DL
HGB UR QL STRIP.AUTO: NORMAL
KETONES UR-MCNC: NORMAL
LEUKOCYTE ESTERASE UR QL STRIP: NORMAL
NITRITE UR QL STRIP: NORMAL
PH UR STRIP: 6
PROT UR STRIP-MCNC: NORMAL
SP GR UR STRIP: 1

## 2019-01-04 RX ORDER — MEDROXYPROGESTERONE ACETATE 150 MG/ML
150 INJECTION, SUSPENSION INTRAMUSCULAR
Refills: 0 | Status: DISCONTINUED | COMMUNITY
End: 2019-01-04

## 2019-01-09 LAB — BACTERIA UR CULT: NORMAL

## 2019-01-11 ENCOUNTER — APPOINTMENT (OUTPATIENT)
Dept: OBGYN | Facility: CLINIC | Age: 29
End: 2019-01-11

## 2019-01-11 ENCOUNTER — OUTPATIENT (OUTPATIENT)
Dept: OUTPATIENT SERVICES | Facility: HOSPITAL | Age: 29
LOS: 1 days | Discharge: HOME | End: 2019-01-11

## 2019-01-11 VITALS — DIASTOLIC BLOOD PRESSURE: 68 MMHG | SYSTOLIC BLOOD PRESSURE: 122 MMHG | BODY MASS INDEX: 37.29 KG/M2 | WEIGHT: 203.9 LBS

## 2019-01-11 RX ORDER — MEDROXYPROGESTERONE ACETATE 150 MG/ML
150 INJECTION, SUSPENSION INTRAMUSCULAR
Qty: 0 | Refills: 0 | Status: COMPLETED | OUTPATIENT
Start: 2019-01-11

## 2019-01-11 NOTE — HISTORY OF PRESENT ILLNESS
[___ Month(s) Ago] : [unfilled] month(s) ago [Good] : being in good health [Reproductive Age] : is of reproductive age [Contraception] : uses contraception [DepoProvera] : uses depo-medroxyprogesterone

## 2019-01-15 ENCOUNTER — APPOINTMENT (OUTPATIENT)
Dept: OBGYN | Facility: CLINIC | Age: 29
End: 2019-01-15

## 2019-01-15 DIAGNOSIS — Z30.42 ENCOUNTER FOR SURVEILLANCE OF INJECTABLE CONTRACEPTIVE: ICD-10-CM

## 2019-01-15 DIAGNOSIS — Z23 ENCOUNTER FOR IMMUNIZATION: ICD-10-CM

## 2019-01-22 ENCOUNTER — APPOINTMENT (OUTPATIENT)
Dept: PULMONOLOGY | Facility: CLINIC | Age: 29
End: 2019-01-22

## 2019-01-25 ENCOUNTER — APPOINTMENT (OUTPATIENT)
Dept: OBGYN | Facility: CLINIC | Age: 29
End: 2019-01-25

## 2019-02-13 ENCOUNTER — OUTPATIENT (OUTPATIENT)
Dept: OUTPATIENT SERVICES | Facility: HOSPITAL | Age: 29
LOS: 1 days | Discharge: HOME | End: 2019-02-13

## 2019-02-13 ENCOUNTER — APPOINTMENT (OUTPATIENT)
Dept: UROGYNECOLOGY | Facility: CLINIC | Age: 29
End: 2019-02-13

## 2019-02-13 VITALS
BODY MASS INDEX: 37.36 KG/M2 | DIASTOLIC BLOOD PRESSURE: 74 MMHG | HEIGHT: 62 IN | WEIGHT: 203 LBS | SYSTOLIC BLOOD PRESSURE: 122 MMHG

## 2019-02-13 NOTE — PHYSICAL EXAM
[No Acute Distress] : in no acute distress [Well developed] : well developed [Well Nourished] : ~L well nourished

## 2019-02-13 NOTE — DISCUSSION/SUMMARY
[FreeTextEntry1] : \par Mixed Incontinence-\par Patient happy with Trospium mg BID.\par Refills provided. 90 days supply with 1 refill.\par Precautions reviewed.\par Will return in 6 months for follow up or earlier if she has any issues.\par  \par Myalgia-\par Patient happy with Flexeril 5 mg at bedtime.\par Refills provided. 90 days supply with 1 refill.\par Precautions reviewed.\par Will return in 6 months for follow up or earlier if she has any issues.\par  \par Urinary Frequency-\par Patient happy with Hydroxyzine 25 mg at bedtime.\par Refills provided. 90 days supply with 1 refill.\par Precautions reviewed.\par Will return in 6 months for follow up or earlier if she has any issues.\par  \par Constipation:\par Advised to follow our recommended bowel recipe to control your constipation.

## 2019-02-13 NOTE — COUNSELING
[FreeTextEntry1] : \par Please continue taking Trospium 20 mg for frequency. Refills sent to your pharmacy.\par \par Please continue taking Flexeril 5 mg for muscle pain. Refills sent to your pharmacy.\par \par Please continue taking Hydroxyzine 25 mg for frequency. Refills sent to your pharmacy.\par \par Please call my office if you have any issues with the cost or side effects of the medication. \par \par Schedule a 6 months follow up med check appointment with GREGG Sow.

## 2019-02-19 DIAGNOSIS — R35.0 FREQUENCY OF MICTURITION: ICD-10-CM

## 2019-02-19 DIAGNOSIS — K59.00 CONSTIPATION, UNSPECIFIED: ICD-10-CM

## 2019-02-19 DIAGNOSIS — N39.46 MIXED INCONTINENCE: ICD-10-CM

## 2019-02-19 DIAGNOSIS — M79.10 MYALGIA, UNSPECIFIED SITE: ICD-10-CM

## 2019-03-28 ENCOUNTER — APPOINTMENT (OUTPATIENT)
Dept: OTOLARYNGOLOGY | Facility: CLINIC | Age: 29
End: 2019-03-28

## 2019-04-06 ENCOUNTER — EMERGENCY (EMERGENCY)
Facility: HOSPITAL | Age: 29
LOS: 0 days | Discharge: HOME | End: 2019-04-06
Admitting: PHYSICIAN ASSISTANT
Payer: MEDICAID

## 2019-04-06 VITALS
DIASTOLIC BLOOD PRESSURE: 86 MMHG | OXYGEN SATURATION: 97 % | HEIGHT: 62 IN | WEIGHT: 195.11 LBS | RESPIRATION RATE: 18 BRPM | HEART RATE: 94 BPM | TEMPERATURE: 97 F | SYSTOLIC BLOOD PRESSURE: 124 MMHG

## 2019-04-06 DIAGNOSIS — Z79.899 OTHER LONG TERM (CURRENT) DRUG THERAPY: ICD-10-CM

## 2019-04-06 DIAGNOSIS — S63.502A UNSPECIFIED SPRAIN OF LEFT WRIST, INITIAL ENCOUNTER: ICD-10-CM

## 2019-04-06 DIAGNOSIS — Z91.048 OTHER NONMEDICINAL SUBSTANCE ALLERGY STATUS: ICD-10-CM

## 2019-04-06 DIAGNOSIS — F41.9 ANXIETY DISORDER, UNSPECIFIED: ICD-10-CM
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
## 2019-04-06 DIAGNOSIS — J45.909 UNSPECIFIED ASTHMA, UNCOMPLICATED: ICD-10-CM
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
## 2019-04-06 DIAGNOSIS — H93.8X1 OTHER SPECIFIED DISORDERS OF RIGHT EAR: ICD-10-CM
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
## 2019-04-06 DIAGNOSIS — Z88.8 ALLERGY STATUS TO OTHER DRUGS, MEDICAMENTS AND BIOLOGICAL SUBSTANCES STATUS: ICD-10-CM

## 2019-04-06 DIAGNOSIS — M25.539 PAIN IN UNSPECIFIED WRIST: ICD-10-CM
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
## 2019-04-06 PROCEDURE — 73110 X-RAY EXAM OF WRIST: CPT | Mod: 26,LT

## 2019-04-06 PROCEDURE — 99283 EMERGENCY DEPT VISIT LOW MDM: CPT | Mod: 25
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
## 2019-04-06 NOTE — ED PROVIDER NOTE - NS ED ROS FT
CONST: No fever, chills or bodyaches  EYES: No pain, redness, drainage or visual changes.  ENT: R ear "popping"  CARD: No chest pain, palpitations  RESP: No SOB, cough  MS: L wrist pain  SKIN: No rashes

## 2019-04-06 NOTE — ED PROVIDER NOTE - PHYSICAL EXAMINATION
CONST: Well appearing in NAD  EYES: PERRL, EOMI, Sclera and conjunctiva clear.   ENT: No nasal discharge. TM's clear B/L without drainage. Oropharynx normal appearing, no erythema or exudates. Uvula midline.  CARD: Normal S1 S2; Normal rate and rhythm  RESP: Equal BS B/L, No wheezes, rhonchi or rales. No distress  MS: Mild tenderness to L ulnar styloid. No deformity, swelling. FROM to digits and wrist  SKIN: Warm, dry, no acute rashes. Good turgor

## 2019-04-06 NOTE — ED PROVIDER NOTE - CARE PLAN
Principal Discharge DX:	Wrist sprain, left, initial encounter  Secondary Diagnosis:	Ear popping, right

## 2019-04-06 NOTE — ED PROVIDER NOTE - NSFOLLOWUPINSTRUCTIONS_ED_ALL_ED_FT
Wrist Sprain, Adult  A wrist sprain is a stretch or tear in the strong, fibrous tissues (ligaments) that connect your wrist bones. There are three types of wrist sprains:    Grade 1. In this type of sprain, the ligament is stretched more than normal.  Grade 2. In this type of sprain, the ligament is partially torn. You may be able to move your wrist, but not very much.  Grade 3. In this type of sprain, the ligament or muscle is completely torn. You may find it difficult or extremely painful to move your wrist even a little.    What are the causes?  A wrist sprain can be caused by using the wrist too much during sports, exercise, or at work. It can also happen with a fall or during an accident.    What increases the risk?  This condition is more likely to occur in people:    With a previous wrist or arm injury.  With poor wrist strength and flexibility.  Who play contact sports, such as football or soccer.  Who play sports that may result in a fall, such as skateboarding, biking, skiing, or snowboarding.  Who do not exercise regularly.  Who use exercise equipment that does not fit well.    What are the signs or symptoms?  Symptoms of this condition include:    Pain in the wrist, arm, or hand.  Swelling or bruised skin near the wrist, hand, or arm. The skin may look yellow or kind of blue.  Stiffness or trouble moving the hand.  Hearing a pop or feeling a tear at the time of the injury.  A warm feeling in the skin around the wrist.    How is this diagnosed?  This condition is diagnosed with a physical exam. Sometimes an X-ray is taken to make sure a bone did not break. If your health care provider thinks that you tore a ligament, he or she may order an MRI of your wrist.    How is this treated?  This condition is treated by resting and applying ice to your wrist. Additional treatment may include:    Medicine for pain and inflammation.  A splint to keep your wrist still (immobilized).  Exercises to strengthen and stretch your wrist.  Surgery. This may be done if the ligament is completely torn.    Follow these instructions at home:  If you have a splint:     Image   Do not put pressure on any part of the splint until it is fully hardened. This may take several hours.  Wear the splint as told by your health care provider. Remove it only as told by your health care provider.  Loosen the splint if your fingers tingle, become numb, or turn cold and blue.  If your splint is not waterproof:    Do not let it get wet.  Cover it with a watertight covering when you take a bath or a shower.    Keep the splint clean.  Managing pain, stiffness, and swelling     Image   If directed, put ice on the injured area.    If you have a removable splint, remove it as told by your health care provider.  Put ice in a plastic bag.  Place a towel between your skin and the bag or between the splint and the bag.  Leave the ice on for 20 minutes, 2–3 times per day.    Move your fingers often to avoid stiffness and to lessen swelling.  Raise (elevate) the injured area above the level of your heart while you are sitting or lying down.  Activity     Rest your wrist. Do not do things that cause pain.  Return to your normal activities as told by your health care provider. Ask your health care provider what activities are safe for you.  Do exercises as told by your health care provider.  General instructions     Take over-the-counter and prescription medicines only as told by your health care provider.  Do not use any products that contain nicotine or tobacco, such as cigarettes and e-cigarettes. These can delay healing. If you need help quitting, ask your health care provider.  Ask your health care provider when it is safe to drive if you have a splint.  Keep all follow-up visits as told by your health care provider. This is important.  Contact a health care provider if:  Your pain, bruising, or swelling gets worse.  Your skin becomes red, gets a rash, or has open sores.  Your pain does not get better or it gets worse.  Get help right away if:  You have a new or sudden sharp pain in the hand, arm, or wrist.  You have tingling or numbness in your hand.  Your fingers turn white, very red, or cold and blue.  You cannot move your fingers.  This information is not intended to replace advice given to you by your health care provider. Make sure you discuss any questions you have with your health care provider.
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
## 2019-05-10 ENCOUNTER — APPOINTMENT (OUTPATIENT)
Dept: OBGYN | Facility: CLINIC | Age: 29
End: 2019-05-10
Payer: MEDICAID

## 2019-05-17 ENCOUNTER — APPOINTMENT (OUTPATIENT)
Dept: OBGYN | Facility: CLINIC | Age: 29
End: 2019-05-17
Payer: MEDICAID

## 2019-05-17 ENCOUNTER — OUTPATIENT (OUTPATIENT)
Dept: OUTPATIENT SERVICES | Facility: HOSPITAL | Age: 29
LOS: 1 days | Discharge: HOME | End: 2019-05-17

## 2019-05-17 ENCOUNTER — RESULT CHARGE (OUTPATIENT)
Age: 29
End: 2019-05-17

## 2019-05-17 ENCOUNTER — APPOINTMENT (OUTPATIENT)
Dept: PULMONOLOGY | Facility: CLINIC | Age: 29
End: 2019-05-17

## 2019-05-17 VITALS — SYSTOLIC BLOOD PRESSURE: 120 MMHG | BODY MASS INDEX: 37.68 KG/M2 | WEIGHT: 206 LBS | DIASTOLIC BLOOD PRESSURE: 80 MMHG

## 2019-05-17 VITALS
WEIGHT: 203 LBS | HEIGHT: 62 IN | HEART RATE: 94 BPM | OXYGEN SATURATION: 98 % | TEMPERATURE: 97.9 F | BODY MASS INDEX: 37.36 KG/M2 | SYSTOLIC BLOOD PRESSURE: 119 MMHG | DIASTOLIC BLOOD PRESSURE: 84 MMHG

## 2019-05-17 DIAGNOSIS — J45.909 UNSPECIFIED ASTHMA, UNCOMPLICATED: ICD-10-CM

## 2019-05-17 PROCEDURE — 99201 OFFICE OUTPATIENT NEW 10 MINUTES: CPT

## 2019-05-17 NOTE — HISTORY OF PRESENT ILLNESS
[FreeTextEntry1] : 28F with sickle cell trait and asthma presents to clinic for initial eval for asthma. She reports poor compliance to symbicort (last time refilled in 2018) and uses proair 20 times a month. She has insomnia and anxiety/depression as well. Denies chest pain, SOB, wheezing. \par Has pets at home, a lot of animals and she does not have allergy symptoms when she touches her animals, but other people's animals.\par She was prescribed symbicort first by an allergy specialist for uncontrolled asthma. \par  \par

## 2019-05-17 NOTE — PHYSICAL EXAM
[General Appearance - Well Developed] : well developed [Normal Appearance] : normal appearance [Well Groomed] : well groomed [General Appearance - In No Acute Distress] : no acute distress [No Deformities] : no deformities [General Appearance - Well Nourished] : well nourished [II] : II [Heart Sounds] : normal S1 and S2 [Heart Rate And Rhythm] : heart rate and rhythm were normal [Murmurs] : no murmurs present [Respiration, Rhythm And Depth] : normal respiratory rhythm and effort [Exaggerated Use Of Accessory Muscles For Inspiration] : no accessory muscle use [Auscultation Breath Sounds / Voice Sounds] : lungs were clear to auscultation bilaterally [Abdomen Soft] : soft [Abdomen Tenderness] : non-tender [Abdomen Mass (___ Cm)] : no abdominal mass palpated [Nail Clubbing] : no clubbing of the fingernails [Cyanosis, Localized] : no localized cyanosis [Petechial Hemorrhages (___cm)] : no petechial hemorrhages [] : the neck was supple

## 2019-05-17 NOTE — END OF VISIT
[] : Resident [Time Spent: ___ minutes] : I have spent [unfilled] minutes of face to face time with the patient [FreeTextEntry3] : concur with the above assessment and plan

## 2019-05-17 NOTE — ASSESSMENT
[FreeTextEntry1] : 28 yo female PMHx asthma presented for evaluation.\par \par # Asthma:\par - requested to bring PFT results next visit.\par - refill symbicort and proair.\par - CXR/ PFTs\par \par rto in 2 months

## 2019-05-20 DIAGNOSIS — Z23 ENCOUNTER FOR IMMUNIZATION: ICD-10-CM

## 2019-05-20 DIAGNOSIS — Z30.42 ENCOUNTER FOR SURVEILLANCE OF INJECTABLE CONTRACEPTIVE: ICD-10-CM

## 2019-05-23 LAB
HCG SERPL-MCNC: <0.6 MIU/ML
HCG UR QL: NEGATIVE

## 2019-05-28 ENCOUNTER — RX RENEWAL (OUTPATIENT)
Age: 29
End: 2019-05-28

## 2019-05-28 ENCOUNTER — APPOINTMENT (OUTPATIENT)
Dept: INTERNAL MEDICINE | Facility: CLINIC | Age: 29
End: 2019-05-28

## 2019-05-28 ENCOUNTER — EMERGENCY (EMERGENCY)
Facility: HOSPITAL | Age: 29
LOS: 0 days | Discharge: HOME | End: 2019-05-28
Admitting: EMERGENCY MEDICINE
Payer: MEDICAID

## 2019-05-28 VITALS
RESPIRATION RATE: 18 BRPM | SYSTOLIC BLOOD PRESSURE: 129 MMHG | DIASTOLIC BLOOD PRESSURE: 74 MMHG | HEART RATE: 118 BPM | TEMPERATURE: 98 F | OXYGEN SATURATION: 99 %

## 2019-05-28 VITALS — HEART RATE: 98 BPM

## 2019-05-28 DIAGNOSIS — Z79.52 LONG TERM (CURRENT) USE OF SYSTEMIC STEROIDS: ICD-10-CM

## 2019-05-28 DIAGNOSIS — J45.909 UNSPECIFIED ASTHMA, UNCOMPLICATED: ICD-10-CM

## 2019-05-28 DIAGNOSIS — Z86.59 PERSONAL HISTORY OF OTHER MENTAL AND BEHAVIORAL DISORDERS: ICD-10-CM

## 2019-05-28 DIAGNOSIS — Z91.048 OTHER NONMEDICINAL SUBSTANCE ALLERGY STATUS: ICD-10-CM

## 2019-05-28 DIAGNOSIS — Z79.899 OTHER LONG TERM (CURRENT) DRUG THERAPY: ICD-10-CM

## 2019-05-28 DIAGNOSIS — L29.9 PRURITUS, UNSPECIFIED: ICD-10-CM

## 2019-05-28 DIAGNOSIS — L98.9 DISORDER OF THE SKIN AND SUBCUTANEOUS TISSUE, UNSPECIFIED: ICD-10-CM

## 2019-05-28 PROCEDURE — 99283 EMERGENCY DEPT VISIT LOW MDM: CPT

## 2019-05-28 NOTE — ED PROVIDER NOTE - OBJECTIVE STATEMENT
30 y/o female with hx Asthma, Skin sensitivity presents to the ED c/o "I have a flare up of my skin sensitivity in my armpits. It might have been my deoderant. I need a note for work." no sob/ difficulty swallowing

## 2019-08-09 ENCOUNTER — OUTPATIENT (OUTPATIENT)
Dept: OUTPATIENT SERVICES | Facility: HOSPITAL | Age: 29
LOS: 1 days | Discharge: HOME | End: 2019-08-09

## 2019-08-09 ENCOUNTER — APPOINTMENT (OUTPATIENT)
Dept: OBGYN | Facility: CLINIC | Age: 29
End: 2019-08-09
Payer: MEDICAID

## 2019-08-09 VITALS
HEIGHT: 62 IN | SYSTOLIC BLOOD PRESSURE: 122 MMHG | WEIGHT: 199 LBS | DIASTOLIC BLOOD PRESSURE: 80 MMHG | BODY MASS INDEX: 36.62 KG/M2

## 2019-08-09 PROCEDURE — 99212 OFFICE O/P EST SF 10 MIN: CPT

## 2019-08-09 RX ORDER — MEDROXYPROGESTERONE ACETATE 150 MG/ML
150 INJECTION, SUSPENSION INTRAMUSCULAR
Qty: 0 | Refills: 0 | Status: COMPLETED | OUTPATIENT
Start: 2019-08-09

## 2019-08-09 RX ADMIN — MEDROXYPROGESTERONE ACETATE 0 MG/ML: 150 INJECTION, SUSPENSION INTRAMUSCULAR at 00:00

## 2019-08-09 NOTE — PHYSICAL EXAM
[Awake] : awake [Alert] : alert [Oriented x3] : oriented to person, place, and time [Acute Distress] : no acute distress [Depressed Mood] : not depressed

## 2019-08-13 DIAGNOSIS — Z30.42 ENCOUNTER FOR SURVEILLANCE OF INJECTABLE CONTRACEPTIVE: ICD-10-CM

## 2019-09-18 ENCOUNTER — APPOINTMENT (OUTPATIENT)
Dept: UROGYNECOLOGY | Facility: CLINIC | Age: 29
End: 2019-09-18
Payer: MEDICAID

## 2019-09-18 ENCOUNTER — OUTPATIENT (OUTPATIENT)
Dept: OUTPATIENT SERVICES | Facility: HOSPITAL | Age: 29
LOS: 1 days | Discharge: HOME | End: 2019-09-18

## 2019-09-18 VITALS
SYSTOLIC BLOOD PRESSURE: 124 MMHG | BODY MASS INDEX: 36.62 KG/M2 | DIASTOLIC BLOOD PRESSURE: 72 MMHG | WEIGHT: 199 LBS | HEIGHT: 62 IN

## 2019-09-18 PROCEDURE — ZZZZZ: CPT

## 2019-09-18 RX ORDER — TROSPIUM CHLORIDE 20 MG/1
20 TABLET, FILM COATED ORAL
Qty: 180 | Refills: 1 | Status: DISCONTINUED | COMMUNITY
Start: 2019-01-04 | End: 2019-09-18

## 2019-09-18 NOTE — COUNSELING
[FreeTextEntry1] : \par If you feel like you have an infection it is important for you to call our office and we will arrange testing of your urine.\par \par Please continue taking Flexeril 5 mg for muscle pain. Refills sent to your pharmacy.\par \par Please continue taking Hydroxyzine 25 mg for frequency. Refills sent to your pharmacy.\par \par Please begin taking Trospium XL 60 mg, once a day for frequency. \par \par Please call my office if you have any issues with the cost or side effects of the medication. \par \par Schedule a 1 year follow up med check appointment with GREGG Sow.

## 2019-09-18 NOTE — HISTORY OF PRESENT ILLNESS
[FreeTextEntry1] : \par Patient is here for 6 months med check for urge incontinence.\par \par Gabapentin 100 mg TID/PRN for anxiety\par Trospium 20 mg BID urge incontinence\par Flexeril 5 mg at bedtime for myalgia\par Hydroxyzine 25 mg at bedtime for urinary frequency\par \par Today, patient states she is happy with Trospium 20 mg BID, Flexeril 5 mg, and Hydroxyzine 25 mg and is noticing improvement. Patient only remembers to take Trospium 20 mg once a day. Denies side effects. Patient does not feel she has an infection.\par \par Patient would like to take Trospium once a day and continue taking Flexeril 5 mg and Hydroxyzine 25 mg at bedtime.

## 2019-09-18 NOTE — DISCUSSION/SUMMARY
[FreeTextEntry1] : \par Mixed Incontinence-\par Prescribed Trospium XL 60 mg daily.\par 90 days supply with 3 refills.\par Precautions reviewed.\par Will return in 1 year for follow up or earlier if she has any issues.\par \par Myalgia-\par Patient happy with Flexeril 5 mg at bedtime.\par Refills provided. 90 days supply with 3 refills.\par Precautions reviewed.\par Will return in 1 year for follow up or earlier if she has any issues.\par \par Urinary Frequency-\par Patient happy with Hydroxyzine 25 mg at bedtime.\par Refills provided. 90 days supply with 3 refills.\par Precautions reviewed.\par Will return in 1 year for follow up or earlier if she has any issues.

## 2019-09-26 DIAGNOSIS — R35.0 FREQUENCY OF MICTURITION: ICD-10-CM

## 2019-09-26 DIAGNOSIS — N39.46 MIXED INCONTINENCE: ICD-10-CM

## 2019-09-26 DIAGNOSIS — M79.18 MYALGIA, OTHER SITE: ICD-10-CM

## 2019-10-25 ENCOUNTER — OUTPATIENT (OUTPATIENT)
Dept: OUTPATIENT SERVICES | Facility: HOSPITAL | Age: 29
LOS: 1 days | Discharge: HOME | End: 2019-10-25

## 2019-10-25 ENCOUNTER — APPOINTMENT (OUTPATIENT)
Dept: OBGYN | Facility: CLINIC | Age: 29
End: 2019-10-25
Payer: MEDICAID

## 2019-10-25 VITALS
BODY MASS INDEX: 36.87 KG/M2 | WEIGHT: 201.56 LBS | SYSTOLIC BLOOD PRESSURE: 120 MMHG | DIASTOLIC BLOOD PRESSURE: 80 MMHG

## 2019-10-25 PROCEDURE — 99213 OFFICE O/P EST LOW 20 MIN: CPT

## 2019-10-25 NOTE — COUNSELING
[Nutrition] : nutrition [Exercise] : exercise [Breast Self Exam] : breast self exam [Vitamins/Supplements] : vitamins/supplements

## 2019-10-29 DIAGNOSIS — Z30.42 ENCOUNTER FOR SURVEILLANCE OF INJECTABLE CONTRACEPTIVE: ICD-10-CM

## 2019-11-15 ENCOUNTER — APPOINTMENT (OUTPATIENT)
Dept: OBGYN | Facility: CLINIC | Age: 29
End: 2019-11-15

## 2019-11-22 ENCOUNTER — APPOINTMENT (OUTPATIENT)
Dept: INTERNAL MEDICINE | Facility: CLINIC | Age: 29
End: 2019-11-22
Payer: MEDICAID

## 2019-11-22 ENCOUNTER — OUTPATIENT (OUTPATIENT)
Dept: OUTPATIENT SERVICES | Facility: HOSPITAL | Age: 29
LOS: 1 days | Discharge: HOME | End: 2019-11-22

## 2019-11-22 VITALS
DIASTOLIC BLOOD PRESSURE: 78 MMHG | HEIGHT: 62 IN | BODY MASS INDEX: 38.09 KG/M2 | WEIGHT: 207 LBS | SYSTOLIC BLOOD PRESSURE: 110 MMHG | HEART RATE: 125 BPM

## 2019-11-22 DIAGNOSIS — E55.9 VITAMIN D DEFICIENCY, UNSPECIFIED: ICD-10-CM

## 2019-11-22 DIAGNOSIS — D57.3 SICKLE-CELL TRAIT: ICD-10-CM

## 2019-11-22 PROCEDURE — 99214 OFFICE O/P EST MOD 30 MIN: CPT | Mod: GC

## 2019-11-22 NOTE — ASSESSMENT
[FreeTextEntry1] : 28F with PMHx of asthma and sickle cell trait here for follow up visit. \par \par #Mild persistent Asthma \par - symbicort PRN noncompliant\par - pulmonology referral for PFT\par - allergist referral\par \par # Recurrent epistaxis:\par ENT exam WNL \par last epistaxis 6 months back\par refer to ENT\par \par # Anxiety\par - follows with psychiatry\par - advised to bring in her medications at next visit \par \par # Vit D deficiency\par - continue with meds\par - lab ordered\par \par # Urge incontinence:\par f/u with Urology\par stable on meds\par \par # HCM\par - Pap smear uptodate\par - recently had Depot progesterone\par - flu shot to given today \par \par \par

## 2019-11-22 NOTE — REVIEW OF SYSTEMS
[Negative] : Neurological [Anxiety] : anxiety [FreeTextEntry4] : occasional epistaxis [FreeTextEntry6] : see hpi [de-identified] : see hpi

## 2019-11-22 NOTE — HISTORY OF PRESENT ILLNESS
[de-identified] : 28 F with sickle cell trait and intermittent asthma presents to clinic for follow up visit.\par no new complaints \par requests medication refill\par follows up with gyn for Depot injection\par follows up with urology for urge incontinence on medication\par follows up with Psych for anxiety and PTSD\par also reports that she gets recurrent epistaxis once in a while; last episode was 6 months back.

## 2019-11-25 DIAGNOSIS — E66.9 OBESITY, UNSPECIFIED: ICD-10-CM

## 2019-11-25 DIAGNOSIS — Z00.00 ENCOUNTER FOR GENERAL ADULT MEDICAL EXAMINATION WITHOUT ABNORMAL FINDINGS: ICD-10-CM

## 2019-11-25 DIAGNOSIS — E55.9 VITAMIN D DEFICIENCY, UNSPECIFIED: ICD-10-CM

## 2019-11-25 DIAGNOSIS — R04.0 EPISTAXIS: ICD-10-CM

## 2019-11-25 DIAGNOSIS — D57.3 SICKLE-CELL TRAIT: ICD-10-CM

## 2019-11-25 DIAGNOSIS — F41.9 ANXIETY DISORDER, UNSPECIFIED: ICD-10-CM

## 2019-12-04 ENCOUNTER — OUTPATIENT (OUTPATIENT)
Dept: OUTPATIENT SERVICES | Facility: HOSPITAL | Age: 29
LOS: 1 days | Discharge: HOME | End: 2019-12-04

## 2020-01-22 ENCOUNTER — APPOINTMENT (OUTPATIENT)
Dept: OBGYN | Facility: CLINIC | Age: 30
End: 2020-01-22
Payer: MEDICAID

## 2020-01-22 ENCOUNTER — OUTPATIENT (OUTPATIENT)
Dept: OUTPATIENT SERVICES | Facility: HOSPITAL | Age: 30
LOS: 1 days | Discharge: HOME | End: 2020-01-22

## 2020-01-22 VITALS
WEIGHT: 205 LBS | DIASTOLIC BLOOD PRESSURE: 80 MMHG | SYSTOLIC BLOOD PRESSURE: 112 MMHG | HEIGHT: 62 IN | BODY MASS INDEX: 37.73 KG/M2

## 2020-01-22 PROCEDURE — 99212 OFFICE O/P EST SF 10 MIN: CPT

## 2020-01-23 DIAGNOSIS — Z30.42 ENCOUNTER FOR SURVEILLANCE OF INJECTABLE CONTRACEPTIVE: ICD-10-CM

## 2020-02-02 ENCOUNTER — TRANSCRIPTION ENCOUNTER (OUTPATIENT)
Age: 30
End: 2020-02-02

## 2020-02-26 ENCOUNTER — OUTPATIENT (OUTPATIENT)
Dept: OUTPATIENT SERVICES | Facility: HOSPITAL | Age: 30
LOS: 1 days | Discharge: HOME | End: 2020-02-26

## 2020-03-07 ENCOUNTER — TRANSCRIPTION ENCOUNTER (OUTPATIENT)
Age: 30
End: 2020-03-07

## 2020-04-15 ENCOUNTER — APPOINTMENT (OUTPATIENT)
Dept: OBGYN | Facility: CLINIC | Age: 30
End: 2020-04-15
Payer: MEDICAID

## 2020-04-15 ENCOUNTER — OUTPATIENT (OUTPATIENT)
Dept: OUTPATIENT SERVICES | Facility: HOSPITAL | Age: 30
LOS: 1 days | Discharge: HOME | End: 2020-04-15

## 2020-04-15 VITALS — SYSTOLIC BLOOD PRESSURE: 108 MMHG | WEIGHT: 215 LBS | BODY MASS INDEX: 39.32 KG/M2 | DIASTOLIC BLOOD PRESSURE: 64 MMHG

## 2020-04-15 PROCEDURE — 99212 OFFICE O/P EST SF 10 MIN: CPT

## 2020-04-15 NOTE — END OF VISIT
[] : Resident [FreeTextEntry3] : Pt here for dmpa. concerned about weight gain. Pt counseled regarding dietary changes. Pt to rv in 3 months for annual and possible dmpa.

## 2020-04-23 ENCOUNTER — RX RENEWAL (OUTPATIENT)
Age: 30
End: 2020-04-23

## 2020-05-12 ENCOUNTER — APPOINTMENT (OUTPATIENT)
Dept: INTERNAL MEDICINE | Facility: CLINIC | Age: 30
End: 2020-05-12

## 2020-07-10 ENCOUNTER — APPOINTMENT (OUTPATIENT)
Dept: PULMONOLOGY | Facility: CLINIC | Age: 30
End: 2020-07-10

## 2020-07-15 ENCOUNTER — APPOINTMENT (OUTPATIENT)
Dept: OBGYN | Facility: CLINIC | Age: 30
End: 2020-07-15

## 2020-07-27 ENCOUNTER — TRANSCRIPTION ENCOUNTER (OUTPATIENT)
Age: 30
End: 2020-07-27

## 2020-09-16 ENCOUNTER — OUTPATIENT (OUTPATIENT)
Dept: OUTPATIENT SERVICES | Facility: HOSPITAL | Age: 30
LOS: 1 days | Discharge: HOME | End: 2020-09-16

## 2020-09-16 ENCOUNTER — APPOINTMENT (OUTPATIENT)
Dept: UROGYNECOLOGY | Facility: CLINIC | Age: 30
End: 2020-09-16
Payer: MEDICAID

## 2020-09-16 VITALS — SYSTOLIC BLOOD PRESSURE: 114 MMHG | DIASTOLIC BLOOD PRESSURE: 70 MMHG | HEIGHT: 62 IN

## 2020-09-16 PROCEDURE — 99213 OFFICE O/P EST LOW 20 MIN: CPT

## 2020-09-16 NOTE — DISCUSSION/SUMMARY
[FreeTextEntry1] : Urge/Mixed Incontinence-\par Patient happy with Trospium ER 60mg QD, Flexeril 5mg QHS, Hydroxyzine 25m QHS. \par Refills provided. 30 days supply with 11 refills.\par Precautions reviewed.\par Advised to see PCP for evaluation of palpitations. Patient voiced her understanding and agrees with the plan.\par Will return in 12 months for follow up or earlier if she has any issues.\par

## 2020-09-16 NOTE — HISTORY OF PRESENT ILLNESS
[FreeTextEntry1] : Patient is here for 1 year follow up for med check for urge incontinence.\par Last seen on 9/18/2019 for 6 months med check.\par \par Gabapentin 100 mg TID/PRN for anxiety\par s/p Trospium 20 mg BID urge incontinence: pt only took it once a day\par Flexeril 5 mg at bedtime for myalgia\par Hydroxyzine 25 mg at bedtime for urinary frequency\par Trospium ER 60mg QD\par \par Today, patient states she is happy with Trospium ER 60mg QD, Flexeril 5mg QHS and Hydroxyzine 25mg QHS. Pt reports great improved in her frequency symptoms. Denies side effects. Patient does not feel she has an infection. \par Pt c/o recent heart palpitations x 2 weeks. \par \par Patient would like to continue the above medications.\par

## 2020-09-16 NOTE — COUNSELING
[FreeTextEntry1] : If you feel like you have an infection it is important for you to call our office and we will arrange testing of your urine.\par \par Please continue taking Trospium ER 60mg once a day, Flexeril 5mg once at bedtime, Hydroxyzine 25mg once at bedtime for frequency. Refills sent to your pharmacy.\par \par Please call my office if you have any issues with the cost or side effects of the medication. \par \par Schedule a 12 months follow up med check appointment.\par

## 2020-09-30 DIAGNOSIS — N39.46 MIXED INCONTINENCE: ICD-10-CM

## 2020-09-30 DIAGNOSIS — M79.18 MYALGIA, OTHER SITE: ICD-10-CM

## 2020-10-01 ENCOUNTER — OUTPATIENT (OUTPATIENT)
Dept: OUTPATIENT SERVICES | Facility: HOSPITAL | Age: 30
LOS: 1 days | End: 2020-10-01
Payer: MEDICAID

## 2020-10-07 ENCOUNTER — EMERGENCY (EMERGENCY)
Facility: HOSPITAL | Age: 30
LOS: 0 days | Discharge: HOME | End: 2020-10-07
Attending: EMERGENCY MEDICINE | Admitting: EMERGENCY MEDICINE
Payer: MEDICAID

## 2020-10-07 VITALS
HEART RATE: 106 BPM | SYSTOLIC BLOOD PRESSURE: 153 MMHG | OXYGEN SATURATION: 100 % | WEIGHT: 214.95 LBS | HEIGHT: 62 IN | TEMPERATURE: 99 F | DIASTOLIC BLOOD PRESSURE: 103 MMHG | RESPIRATION RATE: 18 BRPM

## 2020-10-07 VITALS — HEART RATE: 98 BPM
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
## 2020-10-07 DIAGNOSIS — M25.561 PAIN IN RIGHT KNEE: ICD-10-CM

## 2020-10-07 DIAGNOSIS — M54.5 LOW BACK PAIN: ICD-10-CM
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
## 2020-10-07 DIAGNOSIS — J45.909 UNSPECIFIED ASTHMA, UNCOMPLICATED: ICD-10-CM
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
## 2020-10-07 DIAGNOSIS — Z88.8 ALLERGY STATUS TO OTHER DRUGS, MEDICAMENTS AND BIOLOGICAL SUBSTANCES: ICD-10-CM

## 2020-10-07 DIAGNOSIS — Z79.899 OTHER LONG TERM (CURRENT) DRUG THERAPY: ICD-10-CM

## 2020-10-07 DIAGNOSIS — Z86.59 PERSONAL HISTORY OF OTHER MENTAL AND BEHAVIORAL DISORDERS: ICD-10-CM

## 2020-10-07 DIAGNOSIS — M79.605 PAIN IN LEFT LEG: ICD-10-CM

## 2020-10-07 PROCEDURE — 99284 EMERGENCY DEPT VISIT MOD MDM: CPT

## 2020-10-07 RX ORDER — ACETAMINOPHEN 500 MG
975 TABLET ORAL ONCE
Refills: 0 | Status: COMPLETED | OUTPATIENT
Start: 2020-10-07 | End: 2020-10-07

## 2020-10-07 RX ADMIN — Medication 975 MILLIGRAM(S): at 09:37

## 2020-10-07 NOTE — ED PROVIDER NOTE - PATIENT PORTAL LINK FT
You can access the FollowMyHealth Patient Portal offered by [facility] by registering at the following website: [email or web address]. By joining [facility]’s FollowMyHealth portal, you will also be able to view your health information using other applications (apps) compatible with our system.
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
## 2020-10-07 NOTE — ED PROVIDER NOTE - CLINICAL SUMMARY MEDICAL DECISION MAKING FREE TEXT BOX
29 yo F presented to ED for slip and fall. Pt did not have any tenderness. Improved with tylenol. DC home

## 2020-10-07 NOTE — ED PROVIDER NOTE - NSFOLLOWUPINSTRUCTIONS_ED_ALL_ED_FT
Back Pain    Back pain is very common in adults. The cause of back pain is rarely dangerous and the pain often gets better over time. The cause of your back pain may not be known and may include strain of muscles or ligaments, degeneration of the spinal disks, or arthritis. Occasionally the pain may radiate down your leg(s). Over-the-counter medicines to reduce pain and inflammation are often the most helpful. Stretching and remaining active frequently helps the healing process.     SEEK IMMEDIATE MEDICAL CARE IF YOU HAVE THE FOLLOWING SYMPTOMS: bowel or bladder control problems, unusual weakness or numbness in your arms or legs, nausea or vomiting, abdominal pain, fever, dizziness/lightheadedness. Back Pain    Back pain is very common in adults. The cause of back pain is rarely dangerous and the pain often gets better over time. The cause of your back pain may not be known and may include strain of muscles or ligaments, degeneration of the spinal disks, or arthritis. Occasionally the pain may radiate down your leg(s). Over-the-counter medicines to reduce pain and inflammation are often the most helpful. Stretching and remaining active frequently helps the healing process.     SEEK IMMEDIATE MEDICAL CARE IF YOU HAVE THE FOLLOWING SYMPTOMS: bowel or bladder control problems, unusual weakness or numbness in your arms or legs, nausea or vomiting, abdominal pain, fever, dizziness/lightheadedness.      Leg Pain    WHAT YOU NEED TO KNOW:    Leg pain may be caused by a variety of health conditions. Your tests did not show any broken bones or blood clots.    DISCHARGE INSTRUCTIONS:    Return to the emergency department if:   •You have a fever.      •Your leg starts to swell.      •Your leg pain gets worse.      •You have numbness or tingling in your leg or toes.      •You cannot put any weight on or move your leg.      Contact your healthcare provider if:   •Your pain does not decrease, even after treatment.      •You have questions or concerns about your condition or care.      Medicines:   •NSAIDs, such as ibuprofen, help decrease swelling, pain, and fever. This medicine is available with or without a doctor's order. NSAIDs can cause stomach bleeding or kidney problems in certain people. If you take blood thinner medicine, always ask your healthcare provider if NSAIDs are safe for you. Always read the medicine label and follow directions.      •Take your medicine as directed. Contact your healthcare provider if you think your medicine is not helping or if you have side effects. Tell him of her if you are allergic to any medicine. Keep a list of the medicines, vitamins, and herbs you take. Include the amounts, and when and why you take them. Bring the list or the pill bottles to follow-up visits. Carry your medicine list with you in case of an emergency.      Follow up with your healthcare provider as directed: You may need more tests to find the cause of your leg pain. You may need to see an orthopedic specialist or a physical therapist. Write down your questions so you remember to ask them during your visits.    Manage your leg pain:   •Rest your injured leg so that it can heal. You may need an immobilizer, brace, or splint to limit the movement of your leg. You may need to avoid putting any weight on your leg for at least 48 hours. Return to normal activities as directed.      •Ice the injury for 20 minutes every 4 hours for up to 24 hours, or as directed. Use an ice pack, or put crushed ice in a plastic bag. Cover it with a towel to protect your skin. Ice helps prevent tissue damage and decreases swelling and pain.      •Elevate your injured leg above the level of your heart as often as you can. This will help decrease swelling and pain. If possible, prop your leg on pillows or blankets to keep the area elevated comfortably.       •Use assistive devices as directed. You may need to use a cane or crutches. Assistive devices help decrease pain and pressure on your leg when you walk. Ask your healthcare provider for more information about assistive devices and how to use them correctly.      •Maintain a healthy weight. Extra body weight can cause pressure and pain in your hip, knee, and ankle joints. Ask your healthcare provider how much you should weigh. Ask him to help you create a weight loss plan if you are overweight.

## 2020-10-07 NOTE — ED PROVIDER NOTE - CARE PROVIDER_API CALL
Hal Wallace  ORTHOPAEDIC SURGERY  [address] [place] [address]  Phone: [phone]  Fax: [phone]  Follow Up Time: 1-3 Days

## 2020-10-07 NOTE — ED PROVIDER NOTE - OBJECTIVE STATEMENT
29 yo F PMH overactive bladder c/o L leg shakiness and lower back pain after mechanical fall today. Pt slipped on slippery floor and fell onto L knee. Was able to get up and ambulate after. Now c/o achy L sided lower back pain and feels shaking in leg. Reports she had another fall on Monday where she fell onto R knee and went to [facility] who told her she needs MRI. No head injury or LOC. Sx mild, no alleviating or aggravating factors, no CP, SOB, abd pain, N/V/D, neck pain or urinary sx.

## 2020-10-07 NOTE — ED ADULT NURSE NOTE - OBJECTIVE STATEMENT
30 year old female complaining of lower back pain after falling today in her apartment building. patient states she fell on her left side, denies any head trauma or loc

## 2020-10-07 NOTE — ED PROVIDER NOTE - PHYSICAL EXAMINATION
VITAL SIGNS: I have reviewed nursing notes and confirm.  CONSTITUTIONAL: Well-developed; well-nourished; in no acute distress.  SKIN: Skin exam is warm and dry, no acute rash.  HEAD: Normocephalic; atraumatic.  EYES: PERRL, EOM intact; conjunctiva and sclera clear.  ENT: No nasal discharge; airway clear.   NECK: Supple; non tender.  CARD: S1, S2 normal; no murmurs, gallops, or rubs. Regular rate and rhythm.  RESP: Clear to auscultation bilaterally. No wheezes, rales or rhonchi.  ABD: Normal bowel sounds; soft; non-distended; non-tender.   EXT: Normal ROM. No edema. (+) mild R knee tenderness, L leg with no bony tenderness, DP 2+ b/l, FROM b/l LE, no midline tenderness, no c spine tenderness, ambulating with no difficulty  LYMPH: No acute cervical adenopathy.  NEURO: Alert, oriented. Grossly unremarkable. No focal deficits.  PSYCH: Cooperative, appropriate.

## 2020-10-07 NOTE — ED PROVIDER NOTE - NS ED ROS FT
Constitutional: See HPI. No fever/chills.  Eyes: No visual changes, eye pain or discharge.  ENT: No hearing changes, pain, discharge or infections.  Neck: No neck pain or stiffness.  Cardiac: No chest pain, SOB or edema. No chest pain with exertion.  Respiratory: No cough or respiratory distress. No hemoptysis.   GI: No nausea, vomiting, diarrhea or abdominal pain.  : No dysuria, frequency or burning.   MS: No myalgia, muscle weakness, or joint pain (+) back pain (+) L leg pain  Neuro: No headache or weakness. No LOC.  Skin: No rash.  Except as documented in the HPI, all other systems are negative.

## 2020-10-09 ENCOUNTER — OUTPATIENT (OUTPATIENT)
Dept: OUTPATIENT SERVICES | Facility: HOSPITAL | Age: 30
LOS: 1 days | Discharge: HOME | End: 2020-10-09
Payer: MEDICAID

## 2020-10-09 ENCOUNTER — APPOINTMENT (OUTPATIENT)
Dept: INTERNAL MEDICINE | Facility: CLINIC | Age: 30
End: 2020-10-09
Payer: MEDICAID

## 2020-10-09 VITALS
WEIGHT: 213 LBS | BODY MASS INDEX: 39.2 KG/M2 | HEIGHT: 62 IN | DIASTOLIC BLOOD PRESSURE: 83 MMHG | TEMPERATURE: 96.9 F | SYSTOLIC BLOOD PRESSURE: 131 MMHG | HEART RATE: 111 BPM

## 2020-10-09 DIAGNOSIS — R04.0 EPISTAXIS: ICD-10-CM

## 2020-10-09 PROCEDURE — 93010 ELECTROCARDIOGRAM REPORT: CPT

## 2020-10-09 PROCEDURE — 99213 OFFICE O/P EST LOW 20 MIN: CPT | Mod: GC

## 2020-10-09 NOTE — PLAN
[FreeTextEntry1] : 30 F with sickle cell trait and intermittent asthma presents to clinic for follow up visit.\par \par \par #palpations likely anxiety related \par - f/u TSH\par - EKG\par \par \par #mechanical fall *2 with possible back problems\par -Outpatient follow up with ortho clinic\par \par #Pelvic floor myalgias and urge incontinence - controlled\par -following with urogynceology \par Gabapentin 100 mg TID/PRN for anxiety\par s/p Trospium 20 mg BID urge incontinence: pt only took it once a day\par Flexeril 5 mg at bedtime for myalgia\par Hydroxyzine 25 mg at bedtime for urinary frequency\par Trospium ER 60mg QD\par \par #Mild persistent Asthma \par - symbicort PRN \par - pulmonology referral \par \par # Recurrent epistaxis:\par ENT exam WNL \par last epistaxis 6 months back\par refer to ENT\par \par # Anxiety\par - follows with psychiatry\par -gabapentin prn \par \par # Vit D deficiency\par - lab ordered\par \par # HCM\par -Routine labs\par - Pap smear uptodate\par - flu shot to given today \par - f/u 6 months and prn\par \par

## 2020-10-09 NOTE — PHYSICAL EXAM
[No Acute Distress] : no acute distress [Well Nourished] : well nourished [Well Developed] : well developed [Normal Sclera/Conjunctiva] : normal sclera/conjunctiva [Normal Outer Ear/Nose] : the outer ears and nose were normal in appearance [Normal Oropharynx] : the oropharynx was normal [No JVD] : no jugular venous distention [No Lymphadenopathy] : no lymphadenopathy [No Respiratory Distress] : no respiratory distress  [No Accessory Muscle Use] : no accessory muscle use [Clear to Auscultation] : lungs were clear to auscultation bilaterally [Normal Rate] : normal rate  [Regular Rhythm] : with a regular rhythm [Normal S1, S2] : normal S1 and S2 [Soft] : abdomen soft [Non Tender] : non-tender [No HSM] : no HSM [Normal Bowel Sounds] : normal bowel sounds [No CVA Tenderness] : no CVA  tenderness [No Joint Swelling] : no joint swelling [No Rash] : no rash [Coordination Grossly Intact] : coordination grossly intact [No Focal Deficits] : no focal deficits

## 2020-10-09 NOTE — REVIEW OF SYSTEMS
[Fever] : no fever [Chills] : no chills [Night Sweats] : no night sweats [Discharge] : no discharge [Pain] : no pain [Redness] : no redness [Vision Problems] : no vision problems [Itching] : no itching [Earache] : no earache [Hearing Loss] : no hearing loss [Nosebleed] : no nosebleeds [Nasal Discharge] : no nasal discharge [Sore Throat] : no sore throat [Chest Pain] : no chest pain [Palpitations] : no palpitations [Orthopnea] : no orthopnea [Paroxysmal Nocturnal Dyspnea] : no paroxysmal nocturnal dyspnea [Shortness Of Breath] : no shortness of breath [Wheezing] : no wheezing [Cough] : no cough [Abdominal Pain] : no abdominal pain [Nausea] : no nausea [Vomiting] : no vomiting [Joint Pain] : no joint pain [Joint Stiffness] : no joint stiffness [Muscle Pain] : no muscle pain [Headache] : no headache [Dizziness] : no dizziness [Memory Loss] : no memory loss

## 2020-10-09 NOTE — HISTORY OF PRESENT ILLNESS
[FreeTextEntry1] : 30 F with sickle cell trait and intermittent asthma presents to clinic for follow up visit. [de-identified] : 30 F with sickle cell trait and intermittent asthma presents to clinic for follow up visit.\par Patient was recently in the hospital for mechanicall fall *2 and was seen by orthopedics and need to follow up with outpatient. she is having palpations *2 months, she is also anxious, denied any chest pain, LOC

## 2020-10-14 ENCOUNTER — RESULT CHARGE (OUTPATIENT)
Age: 30
End: 2020-10-14

## 2020-10-14 ENCOUNTER — OUTPATIENT (OUTPATIENT)
Dept: OUTPATIENT SERVICES | Facility: HOSPITAL | Age: 30
LOS: 1 days | Discharge: HOME | End: 2020-10-14

## 2020-10-14 ENCOUNTER — APPOINTMENT (OUTPATIENT)
Dept: OBGYN | Facility: CLINIC | Age: 30
End: 2020-10-14
Payer: MEDICAID

## 2020-10-14 VITALS — DIASTOLIC BLOOD PRESSURE: 80 MMHG | BODY MASS INDEX: 39.69 KG/M2 | SYSTOLIC BLOOD PRESSURE: 120 MMHG | WEIGHT: 217 LBS

## 2020-10-14 DIAGNOSIS — F41.9 ANXIETY DISORDER, UNSPECIFIED: ICD-10-CM

## 2020-10-14 DIAGNOSIS — R04.0 EPISTAXIS: ICD-10-CM

## 2020-10-14 DIAGNOSIS — R00.2 PALPITATIONS: ICD-10-CM

## 2020-10-14 DIAGNOSIS — Z71.89 OTHER SPECIFIED COUNSELING: ICD-10-CM

## 2020-10-14 DIAGNOSIS — J45.20 MILD INTERMITTENT ASTHMA, UNCOMPLICATED: ICD-10-CM

## 2020-10-14 DIAGNOSIS — Z00.00 ENCOUNTER FOR GENERAL ADULT MEDICAL EXAMINATION WITHOUT ABNORMAL FINDINGS: ICD-10-CM

## 2020-10-14 DIAGNOSIS — E66.9 OBESITY, UNSPECIFIED: ICD-10-CM

## 2020-10-14 DIAGNOSIS — Z23 ENCOUNTER FOR IMMUNIZATION: ICD-10-CM

## 2020-10-14 LAB — HCG UR QL: NEGATIVE

## 2020-10-14 PROCEDURE — 99395 PREV VISIT EST AGE 18-39: CPT

## 2020-10-14 RX ORDER — MEDROXYPROGESTERONE ACETATE 150 MG/ML
150 INJECTION, SUSPENSION INTRAMUSCULAR
Qty: 0 | Refills: 0 | Status: COMPLETED | OUTPATIENT
Start: 2020-10-14

## 2020-10-14 RX ADMIN — MEDROXYPROGESTERONE ACETATE 0 MG/ML: 150 INJECTION, SUSPENSION INTRAMUSCULAR at 00:00

## 2020-10-14 NOTE — PHYSICAL EXAM
[Appropriately responsive] : appropriately responsive [Alert] : alert [No Lymphadenopathy] : no lymphadenopathy [No Acute Distress] : no acute distress [Regular Rate Rhythm] : regular rate rhythm [Clear to Auscultation B/L] : clear to auscultation bilaterally [No Murmurs] : no murmurs [Soft] : soft [Non-tender] : non-tender [No HSM] : No HSM [Non-distended] : non-distended [No Lesions] : no lesions [No Mass] : no mass [Oriented x3] : oriented x3 [Examination Of The Breasts] : a normal appearance [No Masses] : no breast masses were palpable [Labia Majora] : normal [Labia Minora] : normal [Normal] : normal [Uterine Adnexae] : normal

## 2020-10-14 NOTE — HISTORY OF PRESENT ILLNESS
[Patient reported PAP Smear was normal] : Patient reported PAP Smear was normal [Currently Active] : currently active [Men] : men [No] : Patient does not have concerns regarding sex [FreeTextEntry1] : 31yo nulligravid, sexually active, for annual exam. Hx of mixed incontinence, on trospium. \par \par Reports 0 incontinence episodes over the past month. \par No sexual incontinence.  [PapSmeardate] : 2020

## 2020-10-14 NOTE — DISCUSSION/SUMMARY
[FreeTextEntry1] : 29yo nulligravid, sexually active, with mixed incontinence, sexually active, monogamous, s/p annual exam today\par -f/u vaginitis pannel\par -f/u STD pannel\par -neg UPREG today\par -s/p DMPA injection Lot#[id number] ex dec 31 2024 \par \par RTC in 3 mo for DMPA\par next annual with papsmear 2021

## 2020-10-14 NOTE — COUNSELING
[Body Image] : body image [Nutrition/ Exercise/ Weight Management] : nutrition, exercise, weight management [Vitamins/Supplements] : vitamins/supplements [Sunscreen] : sunscreen [Smoking Cessation] : smoking cessation [Preconception Care/ Fertility options] : preconception care, fertility options [Pregnancy Options] : pregnancy options [Bladder Hygiene] : bladder hygiene

## 2020-10-18 LAB
A VAGINAE DNA VAG QL NAA+PROBE: ABNORMAL
BVAB2 DNA VAG QL NAA+PROBE: NORMAL
C KRUSEI DNA VAG QL NAA+PROBE: NEGATIVE
C TRACH RRNA SPEC QL NAA+PROBE: NEGATIVE
MEGA1 DNA VAG QL NAA+PROBE: NORMAL
N GONORRHOEA RRNA SPEC QL NAA+PROBE: NEGATIVE
T VAGINALIS RRNA SPEC QL NAA+PROBE: NEGATIVE

## 2020-10-23 DIAGNOSIS — Z01.419 ENCOUNTER FOR GYNECOLOGICAL EXAMINATION (GENERAL) (ROUTINE) WITHOUT ABNORMAL FINDINGS: ICD-10-CM

## 2020-10-23 DIAGNOSIS — Z30.42 ENCOUNTER FOR SURVEILLANCE OF INJECTABLE CONTRACEPTIVE: ICD-10-CM

## 2020-10-23 DIAGNOSIS — N39.46 MIXED INCONTINENCE: ICD-10-CM

## 2020-11-05 LAB
ALBUMIN SERPL ELPH-MCNC: 4 G/DL
ALP BLD-CCNC: 51 U/L
ALT SERPL-CCNC: 9 U/L
ANION GAP SERPL CALC-SCNC: 14 MMOL/L
AST SERPL-CCNC: 11 U/L
BASOPHILS # BLD AUTO: 0.03 K/UL
BASOPHILS NFR BLD AUTO: 0.4 %
BILIRUB SERPL-MCNC: 0.2 MG/DL
BUN SERPL-MCNC: 6 MG/DL
CALCIUM SERPL-MCNC: 9.2 MG/DL
CHLORIDE SERPL-SCNC: 104 MMOL/L
CHOLEST SERPL-MCNC: 136 MG/DL
CO2 SERPL-SCNC: 20 MMOL/L
CREAT SERPL-MCNC: 0.8 MG/DL
EOSINOPHIL # BLD AUTO: 0.21 K/UL
EOSINOPHIL NFR BLD AUTO: 2.8 %
ESTIMATED AVERAGE GLUCOSE: 114 MG/DL
GLUCOSE SERPL-MCNC: 86 MG/DL
HBA1C MFR BLD HPLC: 5.6 %
HCT VFR BLD CALC: 36.4 %
HDLC SERPL-MCNC: 39 MG/DL
HGB BLD-MCNC: 11.6 G/DL
IMM GRANULOCYTES NFR BLD AUTO: 0.4 %
LDLC SERPL CALC-MCNC: 86 MG/DL
LYMPHOCYTES # BLD AUTO: 2.38 K/UL
LYMPHOCYTES NFR BLD AUTO: 31.6 %
MAN DIFF?: NORMAL
MCHC RBC-ENTMCNC: 24.7 PG
MCHC RBC-ENTMCNC: 31.9 G/DL
MCV RBC AUTO: 77.6 FL
MONOCYTES # BLD AUTO: 0.56 K/UL
MONOCYTES NFR BLD AUTO: 7.4 %
NEUTROPHILS # BLD AUTO: 4.32 K/UL
NEUTROPHILS NFR BLD AUTO: 57.4 %
NONHDLC SERPL-MCNC: 97 MG/DL
PLATELET # BLD AUTO: 489 K/UL
POTASSIUM SERPL-SCNC: 4.3 MMOL/L
PROT SERPL-MCNC: 7.2 G/DL
RBC # BLD: 4.69 M/UL
RBC # FLD: 13.8 %
SODIUM SERPL-SCNC: 138 MMOL/L
TRIGL SERPL-MCNC: 57 MG/DL
WBC # FLD AUTO: 7.53 K/UL

## 2020-11-06 LAB
HBV SURFACE AB SER QL: NONREACTIVE
HBV SURFACE AG SER QL: NONREACTIVE
HCV AB SER QL: NONREACTIVE
HCV S/CO RATIO: 0.12 S/CO
HIV1+2 AB SPEC QL IA.RAPID: NONREACTIVE
T PALLIDUM AB SER QL IA: NEGATIVE

## 2020-11-13 ENCOUNTER — APPOINTMENT (OUTPATIENT)
Dept: ORTHOPEDIC SURGERY | Facility: CLINIC | Age: 30
End: 2020-11-13
Payer: MEDICAID

## 2020-11-17 LAB
25(OH)D3 SERPL-MCNC: 29 NG/ML
FERRITIN SERPL-MCNC: 71 NG/ML
FOLATE SERPL-MCNC: 13.3 NG/ML
IRON SERPL-MCNC: 34 UG/DL
M TB IFN-G BLD-IMP: NEGATIVE
QUANTIFERON TB PLUS MITOGEN MINUS NIL: 9.17 IU/ML
QUANTIFERON TB PLUS NIL: 0.02 IU/ML
QUANTIFERON TB PLUS TB1 MINUS NIL: 0 IU/ML
QUANTIFERON TB PLUS TB2 MINUS NIL: 0 IU/ML
TRANSFERRIN SERPL-MCNC: 226 MG/DL
TSH SERPL-ACNC: 1.35 UIU/ML
VIT B12 SERPL-MCNC: 255 PG/ML

## 2020-12-02 ENCOUNTER — NON-APPOINTMENT (OUTPATIENT)
Age: 30
End: 2020-12-02

## 2020-12-10 ENCOUNTER — APPOINTMENT (OUTPATIENT)
Dept: OTOLARYNGOLOGY | Facility: CLINIC | Age: 30
End: 2020-12-10

## 2020-12-11 ENCOUNTER — APPOINTMENT (OUTPATIENT)
Dept: ORTHOPEDIC SURGERY | Facility: CLINIC | Age: 30
End: 2020-12-11
Payer: MEDICAID

## 2020-12-11 VITALS
DIASTOLIC BLOOD PRESSURE: 88 MMHG | OXYGEN SATURATION: 98 % | BODY MASS INDEX: 38.64 KG/M2 | SYSTOLIC BLOOD PRESSURE: 111 MMHG | HEART RATE: 118 BPM | WEIGHT: 210 LBS | TEMPERATURE: 98 F | HEIGHT: 62 IN

## 2020-12-11 PROCEDURE — 99203 OFFICE O/P NEW LOW 30 MIN: CPT

## 2020-12-11 PROCEDURE — 73564 X-RAY EXAM KNEE 4 OR MORE: CPT | Mod: RT

## 2020-12-11 PROCEDURE — 99072 ADDL SUPL MATRL&STAF TM PHE: CPT

## 2020-12-11 RX ORDER — CYCLOBENZAPRINE HYDROCHLORIDE 5 MG/1
5 TABLET, FILM COATED ORAL AT BEDTIME
Qty: 60 | Refills: 5 | Status: DISCONTINUED | COMMUNITY
Start: 2020-12-02 | End: 2020-12-11

## 2020-12-11 RX ORDER — FLUTICASONE PROPIONATE 50 UG/1
50 SPRAY, METERED NASAL DAILY
Qty: 30 | Refills: 1 | Status: DISCONTINUED | OUTPATIENT
Start: 2017-12-28 | End: 2020-12-11

## 2020-12-11 RX ORDER — TROSPIUM CHLORIDE 60 MG/1
60 CAPSULE, EXTENDED RELEASE ORAL
Qty: 90 | Refills: 3 | Status: DISCONTINUED | COMMUNITY
Start: 2020-11-19 | End: 2020-12-11

## 2020-12-11 RX ORDER — FOLIC ACID 1 MG/1
1 TABLET ORAL DAILY
Qty: 30 | Refills: 5 | Status: DISCONTINUED | OUTPATIENT
Start: 2017-12-28 | End: 2020-12-11

## 2020-12-11 RX ORDER — HYDROXYZINE HYDROCHLORIDE 25 MG/1
25 TABLET ORAL
Qty: 90 | Refills: 3 | Status: DISCONTINUED | COMMUNITY
Start: 2020-11-19 | End: 2020-12-11

## 2020-12-11 RX ORDER — CYCLOBENZAPRINE HYDROCHLORIDE 5 MG/1
5 TABLET, FILM COATED ORAL
Qty: 90 | Refills: 3 | Status: DISCONTINUED | COMMUNITY
Start: 2020-11-19 | End: 2020-12-11

## 2020-12-11 RX ORDER — MOMETASONE FUROATE 1 MG/ML
0.1 SOLUTION TOPICAL
Qty: 1 | Refills: 5 | Status: DISCONTINUED | COMMUNITY
Start: 2018-07-17 | End: 2020-12-11

## 2020-12-11 RX ORDER — DOCOSAHEXAENOIC ACID 300 MG
50 MCG CAPSULE ORAL
Qty: 30 | Refills: 3 | Status: DISCONTINUED | COMMUNITY
Start: 2020-04-23 | End: 2020-12-11

## 2020-12-17 ENCOUNTER — EMERGENCY (EMERGENCY)
Facility: HOSPITAL | Age: 30
LOS: 0 days | Discharge: HOME | End: 2020-12-17
Attending: STUDENT IN AN ORGANIZED HEALTH CARE EDUCATION/TRAINING PROGRAM
Payer: MEDICAID

## 2020-12-17 VITALS
RESPIRATION RATE: 18 BRPM | HEART RATE: 98 BPM | OXYGEN SATURATION: 100 % | SYSTOLIC BLOOD PRESSURE: 118 MMHG | DIASTOLIC BLOOD PRESSURE: 87 MMHG | TEMPERATURE: 99 F

## 2020-12-17 VITALS
HEIGHT: 62 IN | TEMPERATURE: 99 F | DIASTOLIC BLOOD PRESSURE: 69 MMHG | OXYGEN SATURATION: 100 % | RESPIRATION RATE: 18 BRPM | HEART RATE: 120 BPM | SYSTOLIC BLOOD PRESSURE: 133 MMHG
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
## 2020-12-17 DIAGNOSIS — Z23 ENCOUNTER FOR IMMUNIZATION: ICD-10-CM

## 2020-12-17 DIAGNOSIS — S61.236A PUNCTURE WOUND WITHOUT FOREIGN BODY OF RIGHT LITTLE FINGER WITHOUT DAMAGE TO NAIL, INITIAL ENCOUNTER: ICD-10-CM
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
## 2020-12-17 DIAGNOSIS — R19.7 DIARRHEA, UNSPECIFIED: ICD-10-CM

## 2020-12-17 DIAGNOSIS — Z91.09 OTHER ALLERGY STATUS, OTHER THAN TO DRUGS AND BIOLOGICAL SUBSTANCES: ICD-10-CM

## 2020-12-17 LAB
ANION GAP SERPL CALC-SCNC: 11 MMOL/L — SIGNIFICANT CHANGE UP (ref 7–14)
APPEARANCE UR: CLEAR — SIGNIFICANT CHANGE UP
BACTERIA # UR AUTO: NEGATIVE — SIGNIFICANT CHANGE UP
BASOPHILS # BLD AUTO: 0.04 K/UL — SIGNIFICANT CHANGE UP (ref 0–0.2)
BASOPHILS NFR BLD AUTO: 0.4 % — SIGNIFICANT CHANGE UP (ref 0–1)
BILIRUB UR-MCNC: NEGATIVE — SIGNIFICANT CHANGE UP
BUN SERPL-MCNC: 7 MG/DL — LOW (ref 10–20)
CALCIUM SERPL-MCNC: 8.9 MG/DL — SIGNIFICANT CHANGE UP (ref 8.5–10.1)
CHLORIDE SERPL-SCNC: 106 MMOL/L — SIGNIFICANT CHANGE UP (ref 98–110)
CO2 SERPL-SCNC: 21 MMOL/L — SIGNIFICANT CHANGE UP (ref 17–32)
COLOR SPEC: SIGNIFICANT CHANGE UP
CREAT SERPL-MCNC: 0.8 MG/DL — SIGNIFICANT CHANGE UP (ref 0.7–1.5)
DIFF PNL FLD: ABNORMAL
EOSINOPHIL # BLD AUTO: 0.25 K/UL — SIGNIFICANT CHANGE UP (ref 0–0.7)
EOSINOPHIL NFR BLD AUTO: 2.5 % — SIGNIFICANT CHANGE UP (ref 0–8)
EPI CELLS # UR: 3 /HPF — SIGNIFICANT CHANGE UP (ref 0–5)
GLUCOSE SERPL-MCNC: 91 MG/DL — SIGNIFICANT CHANGE UP (ref 70–99)
GLUCOSE UR QL: NEGATIVE — SIGNIFICANT CHANGE UP
HCT VFR BLD CALC: 36.1 % — LOW (ref 37–47)
HGB BLD-MCNC: 11.2 G/DL — LOW (ref 12–16)
HYALINE CASTS # UR AUTO: 0 /LPF — SIGNIFICANT CHANGE UP (ref 0–7)
IMM GRANULOCYTES NFR BLD AUTO: 0.4 % — HIGH (ref 0.1–0.3)
KETONES UR-MCNC: NEGATIVE — SIGNIFICANT CHANGE UP
LEUKOCYTE ESTERASE UR-ACNC: ABNORMAL
LYMPHOCYTES # BLD AUTO: 3.14 K/UL — SIGNIFICANT CHANGE UP (ref 1.2–3.4)
LYMPHOCYTES # BLD AUTO: 30.9 % — SIGNIFICANT CHANGE UP (ref 20.5–51.1)
MCHC RBC-ENTMCNC: 24.3 PG — LOW (ref 27–31)
MCHC RBC-ENTMCNC: 31 G/DL — LOW (ref 32–37)
MCV RBC AUTO: 78.3 FL — LOW (ref 81–99)
MONOCYTES # BLD AUTO: 0.74 K/UL — HIGH (ref 0.1–0.6)
MONOCYTES NFR BLD AUTO: 7.3 % — SIGNIFICANT CHANGE UP (ref 1.7–9.3)
NEUTROPHILS # BLD AUTO: 5.95 K/UL — SIGNIFICANT CHANGE UP (ref 1.4–6.5)
NEUTROPHILS NFR BLD AUTO: 58.5 % — SIGNIFICANT CHANGE UP (ref 42.2–75.2)
NITRITE UR-MCNC: NEGATIVE — SIGNIFICANT CHANGE UP
NRBC # BLD: 0 /100 WBCS — SIGNIFICANT CHANGE UP (ref 0–0)
PH UR: 6.5 — SIGNIFICANT CHANGE UP (ref 5–8)
PLATELET # BLD AUTO: 502 K/UL — HIGH (ref 130–400)
POTASSIUM SERPL-MCNC: 4.2 MMOL/L — SIGNIFICANT CHANGE UP (ref 3.5–5)
POTASSIUM SERPL-SCNC: 4.2 MMOL/L — SIGNIFICANT CHANGE UP (ref 3.5–5)
PROT UR-MCNC: NEGATIVE — SIGNIFICANT CHANGE UP
RBC # BLD: 4.61 M/UL — SIGNIFICANT CHANGE UP (ref 4.2–5.4)
RBC # FLD: 13.9 % — SIGNIFICANT CHANGE UP (ref 11.5–14.5)
RBC CASTS # UR COMP ASSIST: 6 /HPF — HIGH (ref 0–4)
SODIUM SERPL-SCNC: 138 MMOL/L — SIGNIFICANT CHANGE UP (ref 135–146)
SP GR SPEC: 1.01 — SIGNIFICANT CHANGE UP (ref 1.01–1.03)
UROBILINOGEN FLD QL: SIGNIFICANT CHANGE UP
WBC # BLD: 10.16 K/UL — SIGNIFICANT CHANGE UP (ref 4.8–10.8)
WBC # FLD AUTO: 10.16 K/UL — SIGNIFICANT CHANGE UP (ref 4.8–10.8)
WBC UR QL: 4 /HPF — SIGNIFICANT CHANGE UP (ref 0–5)

## 2020-12-17 PROCEDURE — 99284 EMERGENCY DEPT VISIT MOD MDM: CPT

## 2020-12-17 RX ORDER — SODIUM CHLORIDE 9 MG/ML
1000 INJECTION INTRAMUSCULAR; INTRAVENOUS; SUBCUTANEOUS ONCE
Refills: 0 | Status: COMPLETED | OUTPATIENT
Start: 2020-12-17 | End: 2020-12-17

## 2020-12-17 RX ORDER — TETANUS TOXOID, REDUCED DIPHTHERIA TOXOID AND ACELLULAR PERTUSSIS VACCINE, ADSORBED 5; 2.5; 8; 8; 2.5 [IU]/.5ML; [IU]/.5ML; UG/.5ML; UG/.5ML; UG/.5ML
0.5 SUSPENSION INTRAMUSCULAR ONCE
Refills: 0 | Status: COMPLETED | OUTPATIENT
Start: 2020-12-17 | End: 2020-12-17

## 2020-12-17 RX ADMIN — TETANUS TOXOID, REDUCED DIPHTHERIA TOXOID AND ACELLULAR PERTUSSIS VACCINE, ADSORBED 0.5 MILLILITER(S): 5; 2.5; 8; 8; 2.5 SUSPENSION INTRAMUSCULAR at 12:00

## 2020-12-17 RX ADMIN — SODIUM CHLORIDE 1000 MILLILITER(S): 9 INJECTION INTRAMUSCULAR; INTRAVENOUS; SUBCUTANEOUS at 12:01

## 2020-12-17 RX ADMIN — Medication 1 TABLET(S): at 12:00

## 2020-12-17 NOTE — ED ADULT NURSE NOTE - NS ED NURSE RECORD ANOTHER HT AND WT
Attempted to call patient 3 times this morning, phone had busy signal. Will attempt to call again this afternoon.   No

## 2020-12-17 NOTE — ED PROVIDER NOTE - PROVIDER TOKENS
FREE:[LAST:[[facility]],PHONE:[(   )    -],FAX:[(   )    -],ADDRESS:[2 days for wound check]]

## 2020-12-17 NOTE — ED PROVIDER NOTE - CARE PROVIDER_API CALL
[facility] ,   2 days for wound check  Phone: (   )    -  Fax: (   )    -  Follow Up Time:

## 2020-12-17 NOTE — ED PROVIDER NOTE - PATIENT PORTAL LINK FT
You can access the FollowMyHealth Patient Portal offered by [facility] by registering at the following website: [email or web address]. By joining [employer]’s FollowMyHealth portal, you will also be able to view your health information using other applications (apps) compatible with our system.

## 2020-12-17 NOTE — ED PROVIDER NOTE - ATTENDING CONTRIBUTION TO CARE
30 y.o. female with a PMH of anxiety and overactive bladder presented to the ER c/o dog bite to Right little finger last night and diarrhea. Pt states that yesterday she was trying to separate her dog, whom was about be attacked by another dog. Pt sustained an abrasion to the R fifth digit. Pt denies any pain to the area, numbness or any other complaint.   In addition, pt also complains of diarrhea, pt reports that she reheated chicken fingers with egg whites and immediately had abdominal pain. Pt denies any blood in the stool, fevers, chills or any other complaints.     VITAL SIGNS: I have reviewed nursing notes and confirm.  CONSTITUTIONAL: non-toxic, well appearing  SKIN: no rash, no petechiae.  EYES: PERRL, EOMI, pink conjunctiva, anicteric  ENT: tongue midline, no exudates, MMM  NECK: Supple; no meningismus, no JVD  CARD: RRR, no murmurs, equal radial pulses bilaterally 2+  RESP: CTAB, no respiratory distress  ABD: Soft, non-tender, non-distended, no peritoneal signs, no HSM, no CVA tenderness  EXT: Normal ROM x4. No edema. No calves tenderness. superficial abrasions noted to the fifth digit, no swelling, tenderness or foreign body visualized.   NEURO: Alert, oriented. CN2-12 intact, equal strength bilaterally, nl gait.  PSYCH: Cooperative, appropriate.    a/p  30 yr old f that presents s/p dog bite and diarrhea  -labs  -antibiotics  -tetatnus  -dispo as per above

## 2020-12-17 NOTE — ED PROVIDER NOTE - OBJECTIVE STATEMENT
30 y.o. female with a PMH of anxiety and overactive bladder presented to the ER c/o dog bite to Right little finger last night.  Pt states that it was her own dog.  Dog was fighting with her other dog and she was  them.  Pt tetanus not up to date.  Pt also c/o four episodes of watery diarrhea thia AM after eating chicken with eggs.  Pt states that she had the same thing happen to her four days ago when she ate the same thing.  Pt denies fever, chills, BRBPR, dysuria, hematuria, flank pain, abnormal vaginal bleeding/d/c.  Pt feeling a little better.

## 2020-12-17 NOTE — ED PROVIDER NOTE - CLINICAL SUMMARY MEDICAL DECISION MAKING FREE TEXT BOX
30 yr old f that presents with dog bite and diarrhea. labs, ua obtained. pt discharged with instructions to return in 48 hrs for wound check. antibiotics. and strict return precautions.

## 2020-12-17 NOTE — ED ADULT NURSE NOTE - OBJECTIVE STATEMENT
patient reports dog bite to right 5th finger small puncture wound noted no active bleeding. patient also complains of diarrhea - no abdominal pain abdomen soft non tender non distended. denies urinary complaints

## 2020-12-17 NOTE — ED PROVIDER NOTE - NSFOLLOWUPINSTRUCTIONS_ED_ALL_ED_FT
Wound Care    Taking care of your wound properly can help to prevent pain and infection. It can also help your wound to heal more quickly.     HOW TO CARE FOR YOUR WOUND   Take or apply over-the-counter and prescription medicines only as told by your health care provider.  If you were prescribed antibiotic medicine, take or apply it as told by your health care provider. Do not stop using the antibiotic even if your condition improves.  Clean the wound each day or as told by your health care provider.  Wash the wound with mild soap and water.  Rinse the wound with water to remove all soap.  Pat the wound dry with a clean towel. Do not rub it.  There are many different ways to close and cover a wound. For example, a wound can be covered with stitches (sutures), skin glue, or adhesive strips. Follow instructions from your health care provider about:  How to take care of your wound.  When and how you should change your bandage (dressing).  When you should remove your dressing.  Removing whatever was used to close your wound.  Check your wound every day for signs of infection. Watch for:  Redness, swelling, or pain.  Fluid, blood, or pus.  Keep the dressing dry until your health care provider says it can be removed. Do not take baths, swim, use a hot tub, or do anything that would put your wound underwater until your health care provider approves.  Raise (elevate) the injured area above the level of your heart while you are sitting or lying down.  Do not scratch or pick at the wound.  Keep all follow-up visits as told by your health care provider. This is important.    SEEK MEDICAL CARE IF:  You received a tetanus shot and you have swelling, severe pain, redness, or bleeding at the injection site.  You have a fever.  Your pain is not controlled with medicine.  You have increased redness, swelling, or pain at the site of your wound.  You have fluid, blood, or pus coming from your wound.  You notice a bad smell coming from your wound or your dressing.    SEEK IMMEDIATE MEDICAL CARE IF:  You have a red streak going away from your wound.          Acute Diarrhea    WHAT YOU NEED TO KNOW:    Acute diarrhea starts quickly and lasts a short time, usually 1 to 3 days. It can last up to 2 weeks. You may not be able to control your diarrhea. Acute diarrhea usually stops on its own.     DISCHARGE INSTRUCTIONS:    Return to the emergency department if:   •You feel confused.       •Your heartbeat is faster than usual.       •Your eyes look deeply sunken, or you have no tears when you cry.       •You urinate less than usual, or your urine is dark yellow.       •You have blood or mucus in your bowel movements.      •You have severe abdominal pain.       •You are unable to drink any liquids.       Contact your healthcare provider if:   •Your symptoms do not get better with treatment.       •You have a fever higher than 101.3°F (38.5°C).       •You have trouble eating and drinking because you are vomiting.       •Your diarrhea does not get better in 7 days.       •You have questions or concerns about your condition or care.       Follow up with your healthcare provider as directed: Write down your questions so you remember to ask them during your visits.     Medicines:  •Diarrhea medicine is an over-the-counter medicine that helps slow or stop your diarrhea. Do not take this medicine unless your healthcare provider says it is okay.       •Antibiotics may be given to help treat an infection caused by bacteria.       •Antiparasitics may be given to treat an infection caused by parasites.       •Take your medicine as directed. Contact your healthcare provider if you think your medicine is not helping or if you have side effects. Tell him of her if you are allergic to any medicine. Keep a list of the medicines, vitamins, and herbs you take. Include the amounts, and when and why you take them. Bring the list or the pill bottles to follow-up visits. Carry your medicine list with you in case of an emergency.      Self-care:   •Drink liquids as directed. Liquids will help prevent dehydration caused by diarrhea. Ask your healthcare provider how much liquid to drink each day and which liquids are best for you. You may need to drink an oral rehydration solution (ORS). An ORS has the right amounts of water, salts, and sugar you need to replace body fluids. You can buy an ORS at most grocery stores and pharmacies.       •Eat foods that are easy to digest. Examples include rice, lentils, cereal, bananas, potatoes, and bread. It also includes some fruits (bananas, melon), well-cooked vegetables, and lean meats. Do not eat foods high in fiber, fat, and sugar. Do not drink alcohol until your diarrhea is gone.       Prevent acute diarrhea:   •Wash your hands often. Use soap and water. Wash your hands before you eat or prepare food. Also wash your hands after you use the bathroom. Use an alcohol-based hand gel when soap and water are not available.   Handwashing           •Keep bathroom surfaces clean. This helps prevent the spread of germs that cause acute diarrhea.       •Wash fruits and vegetables well before you eat them. This can help remove germs that cause diarrhea. If possible, remove the skin from fruits and vegetables, or cook them well before you eat them.       •Cook meat and poultry as directed. Meat includes beef and pork. Poultry includes chicken, turkey, and duck.?Cook ground meat to 160°F.       ?Cook ground poultry, whole poultry, or cuts of poultry to at least 165°F. Remove the poultry from heat. Let it stand for 3 minutes before you eat it.       ?Cook whole cuts of meat other than poultry to at least 145°F. Remove the meat from heat. Let it stand for 3 minutes before you eat it.       •Wash dishes that have touched raw meat or poultry with hot water and soap. This includes cutting boards, utensils, dishes, and serving containers.       •Place raw or cooked meat or poultry in the refrigerator as soon as possible. Bacteria can grow in meat or poultry that is left at room temperature too long.       •Do not eat raw or undercooked oysters, clams, or mussels. These foods may be contaminated and cause infection.       •Drink only filtered or treated water when you travel. Do not put ice in your drinks. Drink bottled water whenever possible.          © Copyright [employer] 2020           back to top                          © Copyright [employer] 2020

## 2020-12-17 NOTE — ED PROVIDER NOTE - SKIN, MLM
(+) superficial puncture noted palmar distal surface of little finger, FROM, no motor or sensory deficit, cap refill < 2 sec

## 2020-12-17 NOTE — ED PROVIDER NOTE - CARE PLAN
Principal Discharge DX:	Diarrhea, unspecified type  Secondary Diagnosis:	Dog bite, initial encounter

## 2020-12-17 NOTE — ED PROVIDER NOTE - CROS ED ENMT ALL NEG
Physical Therapy      Joanna Morales  MRN: [id number]    Patient not seen today secondary to pt unable to arouse for participation with PT x 2 attempts in am and pm. Will follow-up and attempt to complete PT tx as appropriate.    Yulia Mayers, PT     negative...

## 2020-12-18 ENCOUNTER — NON-APPOINTMENT (OUTPATIENT)
Age: 30
End: 2020-12-18

## 2020-12-24 ENCOUNTER — APPOINTMENT (OUTPATIENT)
Dept: OTOLARYNGOLOGY | Facility: CLINIC | Age: 30
End: 2020-12-24

## 2020-12-30 ENCOUNTER — APPOINTMENT (OUTPATIENT)
Dept: OBGYN | Facility: CLINIC | Age: 30
End: 2020-12-30
Payer: MEDICAID

## 2020-12-30 ENCOUNTER — APPOINTMENT (OUTPATIENT)
Dept: PEDIATRIC ALLERGY IMMUNOLOGY | Facility: CLINIC | Age: 30
End: 2020-12-30

## 2020-12-30 ENCOUNTER — OUTPATIENT (OUTPATIENT)
Dept: OUTPATIENT SERVICES | Facility: HOSPITAL | Age: 30
LOS: 1 days | Discharge: HOME | End: 2020-12-30

## 2020-12-30 VITALS — DIASTOLIC BLOOD PRESSURE: 80 MMHG | WEIGHT: 214 LBS | BODY MASS INDEX: 39.14 KG/M2 | SYSTOLIC BLOOD PRESSURE: 120 MMHG

## 2020-12-30 PROCEDURE — 99212 OFFICE O/P EST SF 10 MIN: CPT

## 2020-12-30 NOTE — HISTORY OF PRESENT ILLNESS
[FreeTextEntry1] : 29 y/o P0, presents for Depo shot. Patient is happy with form of contraception and would like to continue. Denies any acute complaints.

## 2020-12-30 NOTE — DISCUSSION/SUMMARY
[FreeTextEntry1] : 31 y/o P0, for Depo shot\par - Lot #: [id number]\par Exp: 6/30/24\par - RTC in 3 months or PRN

## 2021-03-06 ENCOUNTER — EMERGENCY (EMERGENCY)
Facility: HOSPITAL | Age: 31
LOS: 0 days | Discharge: HOME | End: 2021-03-06
Attending: EMERGENCY MEDICINE | Admitting: EMERGENCY MEDICINE
Payer: MEDICAID

## 2021-03-06 VITALS
OXYGEN SATURATION: 100 % | RESPIRATION RATE: 20 BRPM | HEART RATE: 113 BPM | SYSTOLIC BLOOD PRESSURE: 136 MMHG | HEIGHT: 62 IN | DIASTOLIC BLOOD PRESSURE: 86 MMHG | TEMPERATURE: 99 F
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
## 2021-03-06 DIAGNOSIS — F41.9 ANXIETY DISORDER, UNSPECIFIED: ICD-10-CM
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
## 2021-03-06 DIAGNOSIS — Z88.8 ALLERGY STATUS TO OTHER DRUGS, MEDICAMENTS AND BIOLOGICAL SUBSTANCES STATUS: ICD-10-CM

## 2021-03-06 DIAGNOSIS — H57.12 OCULAR PAIN, LEFT EYE: ICD-10-CM

## 2021-03-06 DIAGNOSIS — J45.909 UNSPECIFIED ASTHMA, UNCOMPLICATED: ICD-10-CM

## 2021-03-06 DIAGNOSIS — S05.02XA INJURY OF CONJUNCTIVA AND CORNEAL ABRASION WITHOUT FOREIGN BODY, LEFT EYE, INITIAL ENCOUNTER: ICD-10-CM

## 2021-03-06 DIAGNOSIS — Z79.899 OTHER LONG TERM (CURRENT) DRUG THERAPY: ICD-10-CM

## 2021-03-06 PROCEDURE — 99283 EMERGENCY DEPT VISIT LOW MDM: CPT

## 2021-03-06 RX ORDER — POLYMYXIN B SULF/TRIMETHOPRIM 10000-1/ML
1 DROPS OPHTHALMIC (EYE) ONCE
Refills: 0 | Status: COMPLETED | OUTPATIENT
Start: 2021-03-06 | End: 2021-03-06

## 2021-03-06 RX ADMIN — Medication 1 DROP(S): at 13:15

## 2021-03-06 NOTE — ED PROVIDER NOTE - PATIENT PORTAL LINK FT
You can access the FollowMyHealth Patient Portal offered by [facility] by registering at the following website: [email or web address]. By joining "[employer]’s FollowMyHealth portal, you will also be able to view your health information using other applications (apps) compatible with our system.

## 2021-03-06 NOTE — ED PROVIDER NOTE - NSFOLLOWUPINSTRUCTIONS_ED_ALL_ED_FT
Corneal Abrasion    The cornea is the clear covering at the front and center of the eye. This very thin tissue is made up of many layers. If a scratch or injury causes the corneal epithelium to come off, it is called a corneal abrasion. Symptoms include eye pain, difficulty keeping eye open, and light sensitivity. Do not drive or operate machinery if your eye is patched.    SEEK MEDICAL CARE IF YOU HAVE THE FOLLOWING SYMPTOMS: discharge from eyes, changes in vision, worsening of symptoms.    POLYTRIM ONE DROP IN THE LEFT EYE EVERY 3 HOURS X 7 DAYS.

## 2021-03-06 NOTE — ED PROVIDER NOTE - NSFOLLOWUPCLINICS_GEN_ALL_ED_FT
[facility]  Ophthalmolgy  [address], [address]  [place] [address]  Phone: [phone]  Fax:   Follow Up Time: 1-3 Days

## 2021-03-06 NOTE — ED PROVIDER NOTE - OBJECTIVE STATEMENT
32 y/o female with a PMH of asthma presents to the ED for evaluation of left eye pain s/p her pet bunny rabbit kicking her in the left eye while she was trying to clean his cage. pt reports last tetanus was within 10 years. pt denies visual changes, use of glasses or contacts, fever, or chills.

## 2021-03-06 NOTE — ED PROVIDER NOTE - PROGRESS NOTE DETAILS
FF: pt given polytrim. advised of strict return precautions discussed at bedside. f/u with optho. agreeable to dc. david - tachycardia noted. Patient states has been told by various providers over past 2 years that she has tachycardia. ED records show multiple visits in the past with HR from . Pt states has not been worked up for this in the past. Given no symptoms such as chest pain/ SOB/ palpitations, and chronic nature of problem, can be worked up as outpatient with PMD/ cardiology. This was discussed at length with the patient who is agreeable.

## 2021-03-06 NOTE — ED PROVIDER NOTE - CARE PROVIDER_API CALL
Justin Bethea)  Cardiovascular Disease; Interventional Cardiology  [address] [place] [address]  Phone: [phone]  Fax: [phone]  Follow Up Time:

## 2021-03-06 NOTE — ED PROVIDER NOTE - PHYSICAL EXAMINATION
Physical Exam    Vital Signs: I have reviewed the initial vital signs.  Constitutional: well-nourished, appears stated age, no acute distress  Eyes: Conjunctiva pink, Sclera clear, PERRLA, EOMI without pain. negative hyphema. negative subconjunctival hemorrhage. visual fields intact. visual acuity is 20/20 b/l  fluoroscein stain reveals:  corneal abrasion to the superior region of the iris.   Cardiovascular: S1 and S2, regular rate, regular rhythm, well-perfused extremities, radial pulses equal and 2+ b/l.   Respiratory: unlabored respiratory effort, clear to auscultation bilaterally no wheezing, rales and rhonchi. pt is speaking full sentences. no accessory muscle use.   Musculoskeletal: FROM of b/l upper and lower extremities.   Integumentary: warm, dry, no rash. no facial lacerations or abrasions.   Neurologic: awake, alert  Psychiatric: appropriate mood, appropriate affect

## 2021-03-06 NOTE — ED PROVIDER NOTE - ATTENDING CONTRIBUTION TO CARE
32yo F with PMHx asthma presents for left eye pain after her pet rabbit kicked her in the face while pt was cleaning the cage. Denies other pain or injury. Denies fever, headache, dizziness, visual changes. Does not use contact lenses. Tdap up to date.    Vital signs reviewed  GENERAL: Patient nontoxic appearing, NAD  EYES: Anicteric. Conjunctiva clear. PERRL, EOMI. Fluorescein exam revleas uptake at 12 o'clock position of left cornea, negative barbara sign. Acuity 20/20 OD, OS.   RESPIRATORY: Normal respiratory effort. CTA B/L. No wheezing, rales, rhonchi  CARDIOVASCULAR: Tachycardia, regular rhythm  MUSCULOSKELETAL/EXTREMITIES: Brisk cap refill. Equal radial pulses. No calf tenderness.

## 2021-03-06 NOTE — ED PROVIDER NOTE - NS ED ROS FT
CONST: No fever, chills or bodyaches  EYES: (+) left eye pain   ENT: No ear pain or discharge, nasal discharge or congestion. No sore throat  CARD: No chest pain, palpitations  RESP: No SOB, cough, hemoptysis. No hx of asthma or COPD  GI: No abdominal pain, N/V/D  : No urinary symptoms  MS: No joint pain, back pain or extremity pain/injury  SKIN: No rashes  NEURO: No headache, dizziness, paresthesias or LOC

## 2021-03-16 ENCOUNTER — APPOINTMENT (OUTPATIENT)
Dept: OPHTHALMOLOGY | Facility: CLINIC | Age: 31
End: 2021-03-16

## 2021-03-16 ENCOUNTER — OUTPATIENT (OUTPATIENT)
Dept: OUTPATIENT SERVICES | Facility: HOSPITAL | Age: 31
LOS: 1 days | Discharge: HOME | End: 2021-03-16
Payer: COMMERCIAL

## 2021-03-16 DIAGNOSIS — H15.102 UNSPECIFIED EPISCLERITIS, LEFT EYE: ICD-10-CM

## 2021-03-16 DIAGNOSIS — H04.123 DRY EYE SYNDROME OF BILATERAL LACRIMAL GLANDS: ICD-10-CM

## 2021-03-16 PROCEDURE — 92002 INTRM OPH EXAM NEW PATIENT: CPT

## 2021-03-17 ENCOUNTER — OUTPATIENT (OUTPATIENT)
Dept: OUTPATIENT SERVICES | Facility: HOSPITAL | Age: 31
LOS: 1 days | Discharge: HOME | End: 2021-03-17

## 2021-03-17 ENCOUNTER — APPOINTMENT (OUTPATIENT)
Dept: OBGYN | Facility: CLINIC | Age: 31
End: 2021-03-17
Payer: MEDICAID

## 2021-03-17 VITALS
BODY MASS INDEX: 41.41 KG/M2 | HEIGHT: 62 IN | SYSTOLIC BLOOD PRESSURE: 120 MMHG | DIASTOLIC BLOOD PRESSURE: 78 MMHG | WEIGHT: 225 LBS

## 2021-03-17 PROCEDURE — 99212 OFFICE O/P EST SF 10 MIN: CPT

## 2021-03-17 RX ADMIN — MEDROXYPROGESTERONE ACETATE 0 MG/ML: 150 INJECTION, SUSPENSION INTRAMUSCULAR at 00:00

## 2021-03-30 ENCOUNTER — OUTPATIENT (OUTPATIENT)
Dept: OUTPATIENT SERVICES | Facility: HOSPITAL | Age: 31
LOS: 1 days | Discharge: HOME | End: 2021-03-30
Payer: MEDICAID

## 2021-03-30 ENCOUNTER — APPOINTMENT (OUTPATIENT)
Dept: OPHTHALMOLOGY | Facility: CLINIC | Age: 31
End: 2021-03-30

## 2021-03-30 PROCEDURE — 92012 INTRM OPH EXAM EST PATIENT: CPT

## 2021-06-02 ENCOUNTER — APPOINTMENT (OUTPATIENT)
Dept: OBGYN | Facility: CLINIC | Age: 31
End: 2021-06-02
Payer: MEDICAID

## 2021-06-02 ENCOUNTER — OUTPATIENT (OUTPATIENT)
Dept: OUTPATIENT SERVICES | Facility: HOSPITAL | Age: 31
LOS: 1 days | Discharge: HOME | End: 2021-06-02

## 2021-06-02 VITALS — WEIGHT: 226 LBS | DIASTOLIC BLOOD PRESSURE: 80 MMHG | SYSTOLIC BLOOD PRESSURE: 118 MMHG | BODY MASS INDEX: 41.34 KG/M2

## 2021-06-02 PROCEDURE — 99212 OFFICE O/P EST SF 10 MIN: CPT

## 2021-06-02 RX ORDER — MEDROXYPROGESTERONE ACETATE 150 MG/ML
150 INJECTION, SUSPENSION INTRAMUSCULAR
Qty: 0 | Refills: 0 | Status: COMPLETED | OUTPATIENT
Start: 2021-06-02

## 2021-06-02 RX ADMIN — MEDROXYPROGESTERONE ACETATE 0 MG/ML: 150 INJECTION, SUSPENSION INTRAMUSCULAR at 00:00

## 2021-06-02 NOTE — HISTORY OF PRESENT ILLNESS
[FreeTextEntry1] : Pt here for dmpa. Pt happy with method. Pt not having any bleeding with this method [No] : Patient does not have concerns regarding sex

## 2021-06-08 NOTE — ED ADULT TRIAGE NOTE - NS ED NURSE DIRECT TO ROOM YN
Last Refill: 3/14/2021  Last Visit: 1/19/2021   Next Visit: 6/16/2021    Requested Prescriptions     Pending Prescriptions Disp Refills    tamsulosin (FLOMAX) 0.4 mg capsule 14 Capsule 0     Sig: TAKE 2 CAPSULES BY MOUTH EVERY DAY    dilTIAZem ER (CARDIZEM CD) 300 mg capsule 7 Capsule 0     Sig: TAKE 1 CAPSULE BY MOUTH DAILY    gabapentin (NEURONTIN) 300 mg capsule 14 Capsule 0     Sig: TAKE 1 CAPSULE BY MOUTH TWICE DAILY    simvastatin (ZOCOR) 20 mg tablet 7 Tablet 0     Sig: TAKE 1 TABLET BY MOUTH EVERY DAY IN THE EVENING
No

## 2021-07-15 ENCOUNTER — NON-APPOINTMENT (OUTPATIENT)
Age: 31
End: 2021-07-15

## 2021-08-03 ENCOUNTER — RX RENEWAL (OUTPATIENT)
Age: 31
End: 2021-08-03

## 2021-08-03 ENCOUNTER — EMERGENCY (EMERGENCY)
Facility: HOSPITAL | Age: 31
LOS: 0 days | Discharge: HOME | End: 2021-08-03
Attending: EMERGENCY MEDICINE | Admitting: EMERGENCY MEDICINE
Payer: MEDICAID

## 2021-08-03 VITALS — HEART RATE: 98 BPM

## 2021-08-03 VITALS
HEIGHT: 62 IN | TEMPERATURE: 99 F | WEIGHT: 220.02 LBS | HEART RATE: 120 BPM | SYSTOLIC BLOOD PRESSURE: 139 MMHG | DIASTOLIC BLOOD PRESSURE: 91 MMHG | OXYGEN SATURATION: 100 % | RESPIRATION RATE: 18 BRPM

## 2021-08-03 DIAGNOSIS — K62.5 HEMORRHAGE OF ANUS AND RECTUM: ICD-10-CM

## 2021-08-03 DIAGNOSIS — J45.909 UNSPECIFIED ASTHMA, UNCOMPLICATED: ICD-10-CM

## 2021-08-03 DIAGNOSIS — F41.9 ANXIETY DISORDER, UNSPECIFIED: ICD-10-CM

## 2021-08-03 DIAGNOSIS — Z91.048 OTHER NONMEDICINAL SUBSTANCE ALLERGY STATUS: ICD-10-CM

## 2021-08-03 DIAGNOSIS — K64.8 OTHER HEMORRHOIDS: ICD-10-CM

## 2021-08-03 PROCEDURE — 99283 EMERGENCY DEPT VISIT LOW MDM: CPT

## 2021-08-03 RX ORDER — DOCUSATE SODIUM 100 MG
1 CAPSULE ORAL
Qty: 14 | Refills: 0
Start: 2021-08-03 | End: 2021-08-09

## 2021-08-03 NOTE — ED PROVIDER NOTE - NS ED ROS FT
CONST: No fever, chills or bodyaches  EYES: No pain, redness, drainage or visual changes.  ENT: No ear pain or discharge, nasal discharge or congestion. No sore throat  CARD: No chest pain, palpitations  RESP: No SOB, cough  GI: + rectal bleeding. No abdominal pain, N/V/D  MS: No joint pain, back pain or extremity pain/injury  SKIN: No rashes  NEURO: No headache, dizziness, paresthesias

## 2021-08-03 NOTE — ED PROVIDER NOTE - ATTENDING CONTRIBUTION TO CARE
I personally evaluated the patient. I reviewed the Resident’s or Physician Assistant’s note (as assigned above), and agree with the findings and plan except as documented in my note.  30 yo woman with rectal bleeding recently with likely hemorrhoids and possibly an anal fissure.  Otherwise stable.   Labs ok.  Anusol suppository and stable for GI outpatient follow up.

## 2021-08-03 NOTE — ED PROVIDER NOTE - OBJECTIVE STATEMENT
31 y.o female w/ hx of overactive bladder presents to the ED for evaluation of painless rectal bleeding with last episode 3 days ago.  Pt noted to have painless bleeding while straining during BM.  No lightheaded/dizzy sensation, abd pain, nausea, vomiting, fever, chills, vaginal d/c or bleeding, urinary sxs.  NO further complaints.

## 2021-08-03 NOTE — ED ADULT NURSE NOTE - NSIMPLEMENTINTERV_GEN_ALL_ED
Implemented All Universal Safety Interventions:  [place] to call system. Call bell, personal items and telephone within reach. Instruct patient to call for assistance. Room bathroom lighting operational. Non-slip footwear when patient is off stretcher. Physically safe environment: no spills, clutter or unnecessary equipment. Stretcher in lowest position, wheels locked, appropriate side rails in place.

## 2021-08-03 NOTE — ED PROVIDER NOTE - PROGRESS NOTE DETAILS
Discussed results with pt.  All questions were answered and return precautions discussed.  Pt is asx and comfortable at this time.  Unremarkable re-exam.  No further concerns at this time from pt.  Will follow up with PMD and GI.  Pt understands and agrees with tx plan.

## 2021-08-03 NOTE — ED PROVIDER NOTE - CARE PROVIDER_API CALL
Nicole Brink (MD)  Gastroenterology  [address] [place] [address]  Phone: [phone]  Fax: [phone]  Follow Up Time: 1-3 Days   Nicole Brink)  Gastroenterology  [address] [place] [address]  Phone: [phone]  Fax: [phone]  Follow Up Time: 1-3 Days    Uli Miguel)  Cardiovascular Disease; Internal Medicine; Interventional Cardiology  [address] [place] [address]  Phone: [phone]  Fax: [phone]  Follow Up Time: 1-3 Days

## 2021-08-03 NOTE — ED PROVIDER NOTE - PATIENT PORTAL LINK FT
You can access the FollowMyHealth Patient Portal offered by [facility] by registering at the following website: [email or web address]. By joining [employer]’s FollowMyHealth portal, you will also be able to view your health information using other applications (apps) compatible with our system.

## 2021-08-03 NOTE — ED PROVIDER NOTE - CLINICAL SUMMARY MEDICAL DECISION MAKING FREE TEXT BOX
30 yo woman with rectal bleeding with some pain at times.  Chronic constipation.  appears to have hemorrhoids and possibly an anal fissure.  Anusol suppository and outpatient follow up.

## 2021-08-03 NOTE — ED PROVIDER NOTE - PROVIDER TOKENS
PROVIDER:[TOKEN:[[id number]],FOLLOWUP:[1-3 Days]] PROVIDER:[TOKEN:[[id number]],FOLLOWUP:[1-3 Days]],PROVIDER:[TOKEN:[[id number]],FOLLOWUP:[1-3 Days]]

## 2021-08-03 NOTE — ED PROVIDER NOTE - CARE PROVIDERS DIRECT ADDRESSES
[email or web address]

## 2021-08-03 NOTE — ED PROVIDER NOTE - PHYSICAL EXAMINATION
CONST: Well appearing in NAD  EYES: pink conjunctiva.   GI: Soft, non-tender, non-distended.  GRETCHEN: small internal hemorrhoid, no fissures, chaperoned by RN  MS: Normal ROM in all extremities. No edema of lower extremities, no calf pain, radial pulses 2+ bilaterally  SKIN: Warm, dry, no acute rashes. Good turgor

## 2021-08-18 ENCOUNTER — APPOINTMENT (OUTPATIENT)
Dept: OBGYN | Facility: CLINIC | Age: 31
End: 2021-08-18
Payer: MEDICAID

## 2021-08-18 ENCOUNTER — OUTPATIENT (OUTPATIENT)
Dept: OUTPATIENT SERVICES | Facility: HOSPITAL | Age: 31
LOS: 1 days | Discharge: HOME | End: 2021-08-18

## 2021-08-18 VITALS — HEIGHT: 62 IN | BODY MASS INDEX: 40.48 KG/M2 | WEIGHT: 220 LBS

## 2021-08-18 VITALS — DIASTOLIC BLOOD PRESSURE: 82 MMHG | SYSTOLIC BLOOD PRESSURE: 120 MMHG

## 2021-08-18 PROCEDURE — 99213 OFFICE O/P EST LOW 20 MIN: CPT

## 2021-08-18 RX ORDER — MEDROXYPROGESTERONE ACETATE 150 MG/ML
150 INJECTION, SUSPENSION INTRAMUSCULAR
Qty: 0 | Refills: 0 | Status: COMPLETED | OUTPATIENT
Start: 2021-08-18

## 2021-08-18 RX ADMIN — MEDROXYPROGESTERONE ACETATE 0 MG/ML: 150 INJECTION, SUSPENSION INTRAMUSCULAR at 00:00

## 2021-08-18 NOTE — DISCUSSION/SUMMARY
[FreeTextEntry1] : 23yo G0 s/p Depo with benign GYN exam.\par - Depo administered in right deltoid, Lot #[id number], Exp 3/31/25\par - f/u TVUS\par - f/u STD panel\par - f/u for Depo and annual in November 2021

## 2021-08-18 NOTE — HISTORY OF PRESENT ILLNESS
[FreeTextEntry1] : 32yo G0, here for Depo with complaints of LLQ pain. Pain happened a few weeks ago, "squeezing" in nature, on/off, worsened with sitting. Was seen in ED a few weeks ago for pain and rectal bleeding and was told she has hemorrhoids. Referred to see GYN outpatient. Reports chronic pelvic pain and incontinence, which she sees  (urogyn) for. Sexually active with dyspareunia. Request STD testing. Content with Depo for contraception. Reports not taking vitamins.

## 2021-08-19 DIAGNOSIS — R10.2 PELVIC AND PERINEAL PAIN: ICD-10-CM

## 2021-08-19 DIAGNOSIS — Z30.42 ENCOUNTER FOR SURVEILLANCE OF INJECTABLE CONTRACEPTIVE: ICD-10-CM

## 2021-08-19 DIAGNOSIS — Z00.00 ENCOUNTER FOR GENERAL ADULT MEDICAL EXAMINATION WITHOUT ABNORMAL FINDINGS: ICD-10-CM

## 2021-08-25 LAB
C TRACH RRNA SPEC QL NAA+PROBE: NOT DETECTED
HBV SURFACE AG SER QL: NONREACTIVE
HCV AB SER QL: NONREACTIVE
HCV S/CO RATIO: 0.14 S/CO
HIV1+2 AB SPEC QL IA.RAPID: NONREACTIVE
N GONORRHOEA RRNA SPEC QL NAA+PROBE: NOT DETECTED
SOURCE AMPLIFICATION: NORMAL
T PALLIDUM AB SER QL IA: NEGATIVE

## 2021-08-26 ENCOUNTER — OUTPATIENT (OUTPATIENT)
Dept: OUTPATIENT SERVICES | Facility: HOSPITAL | Age: 31
LOS: 1 days | Discharge: HOME | End: 2021-08-26

## 2021-08-26 ENCOUNTER — ASOB RESULT (OUTPATIENT)
Age: 31
End: 2021-08-26

## 2021-08-26 ENCOUNTER — APPOINTMENT (OUTPATIENT)
Dept: ANTEPARTUM | Facility: CLINIC | Age: 31
End: 2021-08-26
Payer: MEDICAID

## 2021-08-26 PROCEDURE — 76830 TRANSVAGINAL US NON-OB: CPT | Mod: 26

## 2021-09-23 ENCOUNTER — NON-APPOINTMENT (OUTPATIENT)
Age: 31
End: 2021-09-23

## 2021-09-23 ENCOUNTER — OUTPATIENT (OUTPATIENT)
Dept: OUTPATIENT SERVICES | Facility: HOSPITAL | Age: 31
LOS: 1 days | Discharge: HOME | End: 2021-09-23

## 2021-09-23 ENCOUNTER — APPOINTMENT (OUTPATIENT)
Dept: INTERNAL MEDICINE | Facility: CLINIC | Age: 31
End: 2021-09-23
Payer: MEDICAID

## 2021-09-23 VITALS
HEIGHT: 62 IN | SYSTOLIC BLOOD PRESSURE: 118 MMHG | BODY MASS INDEX: 40.67 KG/M2 | HEART RATE: 109 BPM | WEIGHT: 221 LBS | TEMPERATURE: 97 F | DIASTOLIC BLOOD PRESSURE: 83 MMHG | OXYGEN SATURATION: 98 %

## 2021-09-23 PROCEDURE — 99213 OFFICE O/P EST LOW 20 MIN: CPT | Mod: GC

## 2021-09-23 NOTE — HISTORY OF PRESENT ILLNESS
[FreeTextEntry1] : vaccination concerns [de-identified] : 31 F with sickle cell trait and intermittent asthma presents to clinic for follow up visit. Patient works as home health aide and came to clinic  to discuss about vaccination concerns. She says that she has a ton of allergies. Denies any active compliants

## 2021-09-23 NOTE — ASSESSMENT
[FreeTextEntry1] : 31 F with sickle cell trait and intermittent asthma presents to clinic for follow up visit.\par \par #Encounter for vaccine administration\par - pt says she has ton of allergies\par - Will send for allergy referral\par \par #palpations\par - f/u TSH\par \par #Mild persistent Asthma \par - symbicort PRN \par \par # Anxiety\par - follows with psychiatry\par \par # HCM\par - Routine labs ordered\par rto after allergy tested\par \par

## 2021-09-26 DIAGNOSIS — Z23 ENCOUNTER FOR IMMUNIZATION: ICD-10-CM

## 2021-10-26 ENCOUNTER — NON-APPOINTMENT (OUTPATIENT)
Age: 31
End: 2021-10-26

## 2021-10-26 ENCOUNTER — APPOINTMENT (OUTPATIENT)
Dept: GASTROENTEROLOGY | Facility: CLINIC | Age: 31
End: 2021-10-26
Payer: MEDICAID

## 2021-10-26 ENCOUNTER — OUTPATIENT (OUTPATIENT)
Dept: OUTPATIENT SERVICES | Facility: HOSPITAL | Age: 31
LOS: 1 days | Discharge: HOME | End: 2021-10-26

## 2021-10-26 DIAGNOSIS — K92.1 MELENA: ICD-10-CM

## 2021-10-26 PROCEDURE — 99202 OFFICE O/P NEW SF 15 MIN: CPT | Mod: GC

## 2021-10-26 NOTE — PHYSICAL EXAM
[General Appearance - Alert] : alert [General Appearance - In No Acute Distress] : in no acute distress [Sclera] : the sclera and conjunctiva were normal [PERRL With Normal Accommodation] : pupils were equal in size, round, and reactive to light [Extraocular Movements] : extraocular movements were intact [Outer Ear] : the ears and nose were normal in appearance [Neck Appearance] : the appearance of the neck was normal [Respiration, Rhythm And Depth] : normal respiratory rhythm and effort [Exaggerated Use Of Accessory Muscles For Inspiration] : no accessory muscle use [Edema] : there was no peripheral edema [Bowel Sounds] : normal bowel sounds [Abdomen Soft] : soft [Abdomen Tenderness] : non-tender [] : no hepato-splenomegaly [Abdomen Mass (___ Cm)] : no abdominal mass palpated [Oriented To Time, Place, And Person] : oriented to person, place, and time [FreeTextEntry1] : Morbidly obese

## 2021-10-26 NOTE — ASSESSMENT
[FreeTextEntry1] : 31-year-old woman with history of sickle cell trait, nondominant asthma, obesity and chronic constipation, being evaluated for history of hematochezia.\par \par #hematochezia\par #chronic constipation\par hematochezia is likely due to hemorrhoidal bleeding however, it would be reasonable to rule out other underlying pathology including bleeding polyps and unlikely CRC\par \par -Recommend high-fiber diet\par -MiraLAX every 12 to 24 hours as needed for constipation\par -Check CBC and CMP\par -We will schedule the patient for colonoscopy\par -Bowel preparation sent to pharmacy and instructions given to patient\par -Follow-up after colonoscopy\par

## 2021-10-26 NOTE — HISTORY OF PRESENT ILLNESS
[FreeTextEntry1] : 31-year-old woman with history of sickle cell trait, nondominant asthma, obesity and chronic constipation, being evaluated for history of hematochezia.\par History goes back to August when patient presented to the ED after having an episode of painless hematochezia.  She does not remember her episodes back then however, per ED note, she had painless rectal bleeding 3 days prior to her presentation back then.  She reported having hematochezia while straining during a bowel movement.  She states that she has chronic constipation for which she takes stool softeners occasionally.\par She denies having any dizziness or lightheadedness, no palpitations, no chest pain, no shortness of breath, and no abdominal pain.\par She denies having any weight loss or appetite changes recently.\par She only complains of occasional heartburn that is mostly diet related, worse postprandially and improved with avoiding spicy and greasy food.\par Otherwise, she has chronic constipation and passes bowel movements around twice a week, and has to strain very often.\par Review of systems otherwise negative.\par \par She denies any abdominal surgery.\par She is unsure about family history, states that cancer runs in the family but is unaware of any relatives with CRC.

## 2021-11-03 ENCOUNTER — APPOINTMENT (OUTPATIENT)
Dept: OBGYN | Facility: CLINIC | Age: 31
End: 2021-11-03
Payer: MEDICAID

## 2021-11-03 ENCOUNTER — OUTPATIENT (OUTPATIENT)
Dept: OUTPATIENT SERVICES | Facility: HOSPITAL | Age: 31
LOS: 1 days | Discharge: HOME | End: 2021-11-03

## 2021-11-03 VITALS — WEIGHT: 221 LBS | BODY MASS INDEX: 40.42 KG/M2 | SYSTOLIC BLOOD PRESSURE: 110 MMHG | DIASTOLIC BLOOD PRESSURE: 70 MMHG

## 2021-11-03 PROCEDURE — 99395 PREV VISIT EST AGE 18-39: CPT

## 2021-11-03 RX ORDER — MEDROXYPROGESTERONE ACETATE 150 MG/ML
150 INJECTION, SUSPENSION INTRAMUSCULAR
Qty: 0 | Refills: 0 | Status: COMPLETED | OUTPATIENT
Start: 2021-11-03

## 2021-11-03 RX ADMIN — MEDROXYPROGESTERONE ACETATE 0 MG/ML: 150 INJECTION, SUSPENSION INTRAMUSCULAR at 00:00

## 2021-11-04 ENCOUNTER — OUTPATIENT (OUTPATIENT)
Dept: OUTPATIENT SERVICES | Facility: HOSPITAL | Age: 31
LOS: 1 days | Discharge: HOME | End: 2021-11-04

## 2021-11-08 ENCOUNTER — NON-APPOINTMENT (OUTPATIENT)
Age: 31
End: 2021-11-08

## 2021-11-08 LAB
A VAGINAE DNA VAG QL NAA+PROBE: NORMAL
BVAB2 DNA VAG QL NAA+PROBE: NORMAL
C KRUSEI DNA VAG QL NAA+PROBE: NEGATIVE
C KRUSEI DNA VAG QL NAA+PROBE: POSITIVE
C TRACH RRNA SPEC QL NAA+PROBE: NEGATIVE
MEGA1 DNA VAG QL NAA+PROBE: NORMAL
N GONORRHOEA RRNA SPEC QL NAA+PROBE: NEGATIVE
T VAGINALIS RRNA SPEC QL NAA+PROBE: NEGATIVE

## 2021-11-09 ENCOUNTER — NON-APPOINTMENT (OUTPATIENT)
Age: 31
End: 2021-11-09

## 2021-11-22 ENCOUNTER — APPOINTMENT (OUTPATIENT)
Dept: CARDIOLOGY | Facility: CLINIC | Age: 31
End: 2021-11-22
Payer: MEDICAID

## 2021-11-22 VITALS
SYSTOLIC BLOOD PRESSURE: 130 MMHG | TEMPERATURE: 97.5 F | HEIGHT: 62 IN | WEIGHT: 220 LBS | HEART RATE: 108 BPM | BODY MASS INDEX: 40.48 KG/M2 | DIASTOLIC BLOOD PRESSURE: 80 MMHG

## 2021-11-22 LAB — CYTOLOGY CVX/VAG DOC THIN PREP: ABNORMAL

## 2021-11-22 PROCEDURE — 93000 ELECTROCARDIOGRAM COMPLETE: CPT

## 2021-11-22 PROCEDURE — 99203 OFFICE O/P NEW LOW 30 MIN: CPT

## 2021-11-22 NOTE — ASSESSMENT
[FreeTextEntry1] : Palpitations, tachycardia\par Likely Sinus tachy\par R/o SVT\par \par Schedule for 2D echo and event monitor.\par TSH - normal\par F/u after the tests. If normal, return PRN, f/u with PMD for evaluation of non-cardiac etiology of her symptoms.

## 2021-11-22 NOTE — REVIEW OF SYSTEMS
[Feeling Fatigued] : feeling fatigued [Palpitations] : palpitations [Joint Pain] : joint pain [Depression] : depression [Negative] : Heme/Lymph

## 2021-11-22 NOTE — HISTORY OF PRESENT ILLNESS
[FreeTextEntry1] : 30 y/o female, with history as below, presents for evaluation of intermittent palpitations, feeling of tachycardia. No chest pain. No syncope.

## 2021-11-22 NOTE — PHYSICAL EXAM
[Well Developed] : well developed [No Acute Distress] : no acute distress [Obese] : obese [Normal Conjunctiva] : normal conjunctiva [Normal] : soft, non-tender, no masses/organomegaly, normal bowel sounds [Normal Gait] : normal gait [No Edema] : no edema [No Cyanosis] : no cyanosis [No Rash] : no rash [Moves all extremities] : moves all extremities [No Focal Deficits] : no focal deficits [Alert and Oriented] : alert and oriented [de-identified] : No JVD

## 2021-12-01 PROCEDURE — G9005: CPT

## 2021-12-02 ENCOUNTER — APPOINTMENT (OUTPATIENT)
Dept: GASTROENTEROLOGY | Facility: CLINIC | Age: 31
End: 2021-12-02

## 2021-12-06 ENCOUNTER — APPOINTMENT (OUTPATIENT)
Dept: CARDIOLOGY | Facility: CLINIC | Age: 31
End: 2021-12-06
Payer: MEDICAID

## 2021-12-06 PROCEDURE — 93244 EXT ECG>48HR<7D REV&INTERPJ: CPT

## 2021-12-28 ENCOUNTER — APPOINTMENT (OUTPATIENT)
Dept: CARDIOLOGY | Facility: CLINIC | Age: 31
End: 2021-12-28
Payer: MEDICAID

## 2021-12-28 PROCEDURE — 93306 TTE W/DOPPLER COMPLETE: CPT

## 2021-12-29 ENCOUNTER — EMERGENCY (EMERGENCY)
Facility: HOSPITAL | Age: 31
LOS: 0 days | Discharge: HOME | End: 2021-12-29
Attending: EMERGENCY MEDICINE | Admitting: EMERGENCY MEDICINE
Payer: MEDICAID

## 2021-12-29 VITALS
SYSTOLIC BLOOD PRESSURE: 159 MMHG | HEART RATE: 124 BPM | TEMPERATURE: 98 F | WEIGHT: 220.02 LBS | OXYGEN SATURATION: 100 % | DIASTOLIC BLOOD PRESSURE: 89 MMHG | RESPIRATION RATE: 20 BRPM | HEIGHT: 62 IN

## 2021-12-29 VITALS — SYSTOLIC BLOOD PRESSURE: 121 MMHG | HEART RATE: 112 BPM | DIASTOLIC BLOOD PRESSURE: 67 MMHG

## 2021-12-29 DIAGNOSIS — R51.9 HEADACHE, UNSPECIFIED: ICD-10-CM

## 2021-12-29 DIAGNOSIS — J45.909 UNSPECIFIED ASTHMA, UNCOMPLICATED: ICD-10-CM

## 2021-12-29 DIAGNOSIS — I10 ESSENTIAL (PRIMARY) HYPERTENSION: ICD-10-CM

## 2021-12-29 DIAGNOSIS — G43.909 MIGRAINE, UNSPECIFIED, NOT INTRACTABLE, WITHOUT STATUS MIGRAINOSUS: ICD-10-CM

## 2021-12-29 DIAGNOSIS — R04.0 EPISTAXIS: ICD-10-CM

## 2021-12-29 DIAGNOSIS — Z91.048 OTHER NONMEDICINAL SUBSTANCE ALLERGY STATUS: ICD-10-CM

## 2021-12-29 PROCEDURE — 99283 EMERGENCY DEPT VISIT LOW MDM: CPT

## 2021-12-29 RX ORDER — IBUPROFEN 200 MG
800 TABLET ORAL ONCE
Refills: 0 | Status: COMPLETED | OUTPATIENT
Start: 2021-12-29 | End: 2021-12-29

## 2021-12-29 RX ADMIN — Medication 800 MILLIGRAM(S): at 16:38

## 2021-12-29 NOTE — ED PROVIDER NOTE - NSFOLLOWUPINSTRUCTIONS_ED_ALL_ED_FT
MAKE AN APPOINTMENT WITH YOUR DOCTOR.     Hypertension  Hypertension, commonly called high blood pressure, is when the force of blood pumping through the arteries is too strong. The arteries are the blood vessels that carry blood from the heart throughout the body. Hypertension forces the heart to work harder to pump blood and may cause arteries to become narrow or stiff. Having untreated or uncontrolled hypertension can cause heart attacks, strokes, kidney disease, and other problems.    A blood pressure reading consists of a higher number over a lower number. Ideally, your blood pressure should be below 120/80. The first ("top") number is called the systolic pressure. It is a measure of the pressure in your arteries as your heart beats. The second ("bottom") number is called the diastolic pressure. It is a measure of the pressure in your arteries as the heart relaxes.    What are the causes?  The cause of this condition is not known.    What increases the risk?  Some risk factors for high blood pressure are under your control. Others are not.    Factors you can change     Smoking.  Having type 2 diabetes mellitus, high cholesterol, or both.  Not getting enough exercise or physical activity.  Being overweight.  Having too much fat, sugar, calories, or salt (sodium) in your diet.  Drinking too much alcohol.  Factors that are difficult or impossible to change     Having chronic kidney disease.  Having a family history of high blood pressure.  Age. Risk increases with age.  Race. You may be at higher risk if you are -American.  Gender. Men are at higher risk than women before age 45. After age 65, women are at higher risk than men.  Having obstructive sleep apnea.  Stress.  What are the signs or symptoms?  Extremely high blood pressure (hypertensive crisis) may cause:    Headache.  Anxiety.  Shortness of breath.  Nosebleed.  Nausea and vomiting.  Severe chest pain.  Jerky movements you cannot control (seizures).    How is this diagnosed?  This condition is diagnosed by measuring your blood pressure while you are seated, with your arm resting on a surface. The cuff of the blood pressure monitor will be placed directly against the skin of your upper arm at the level of your heart. It should be measured at least twice using the same arm. Certain conditions can cause a difference in blood pressure between your right and left arms.    Certain factors can cause blood pressure readings to be lower or higher than normal (elevated) for a short period of time:    When your blood pressure is higher when you are in a health care provider's office than when you are at home, this is called white coat hypertension. Most people with this condition do not need medicines.  When your blood pressure is higher at home than when you are in a health care provider's office, this is called masked hypertension. Most people with this condition may need medicines to control blood pressure.    If you have a high blood pressure reading during one visit or you have normal blood pressure with other risk factors:    You may be asked to return on a different day to have your blood pressure checked again.  You may be asked to monitor your blood pressure at home for 1 week or longer.    If you are diagnosed with hypertension, you may have other blood or imaging tests to help your health care provider understand your overall risk for other conditions.    How is this treated?  This condition is treated by making healthy lifestyle changes, such as eating healthy foods, exercising more, and reducing your alcohol intake. Your health care provider may prescribe medicine if lifestyle changes are not enough to get your blood pressure under control, and if:    Your systolic blood pressure is above 130.  Your diastolic blood pressure is above 80.    Your personal target blood pressure may vary depending on your medical conditions, your age, and other factors.    Follow these instructions at home:  Eating and drinking     Eat a diet that is high in fiber and potassium, and low in sodium, added sugar, and fat. An example eating plan is called the DASH (Dietary Approaches to Stop Hypertension) diet. To eat this way:    Eat plenty of fresh fruits and vegetables. Try to fill half of your plate at each meal with fruits and vegetables.  Eat whole grains, such as whole wheat pasta, brown rice, or whole grain bread. Fill about one quarter of your plate with whole grains.  Eat or drink low-fat dairy products, such as skim milk or low-fat yogurt.  Avoid fatty cuts of meat, processed or cured meats, and poultry with skin. Fill about one quarter of your plate with lean proteins, such as fish, chicken without skin, beans, eggs, and tofu.  Avoid premade and processed foods. These tend to be higher in sodium, added sugar, and fat.    Reduce your daily sodium intake. Most people with hypertension should eat less than 1,500 mg of sodium a day.  ImageLimit alcohol intake to no more than 1 drink a day for nonpregnant women and 2 drinks a day for men. One drink equals 12 oz of beer, 5 oz of wine, or 1½ oz of hard liquor.  Lifestyle     Work with your health care provider to maintain a healthy body weight or to lose weight. Ask what an ideal weight is for you.  Get at least 30 minutes of exercise that causes your heart to beat faster (aerobic exercise) most days of the week. Activities may include walking, swimming, or biking.  Include exercise to strengthen your muscles (resistance exercise), such as pilates or lifting weights, as part of your weekly exercise routine. Try to do these types of exercises for 30 minutes at least 3 days a week.  Do not use any products that contain nicotine or tobacco, such as cigarettes and e-cigarettes. If you need help quitting, ask your health care provider.  Monitor your blood pressure at home as told by your health care provider.  Keep all follow-up visits as told by your health care provider. This is important.  Medicines     Take over-the-counter and prescription medicines only as told by your health care provider. Follow directions carefully. Blood pressure medicines must be taken as prescribed.  Do not skip doses of blood pressure medicine. Doing this puts you at risk for problems and can make the medicine less effective.  Ask your health care provider about side effects or reactions to medicines that you should watch for.  Contact a health care provider if:  You think you are having a reaction to a medicine you are taking.  You have headaches that keep coming back (recurring).  You feel dizzy.  You have swelling in your ankles.  You have trouble with your vision.  Get help right away if:  You develop a severe headache or confusion.  You have unusual weakness or numbness.  You feel faint.  You have severe pain in your chest or abdomen.  You vomit repeatedly.  You have trouble breathing.  Summary  Hypertension is when the force of blood pumping through your arteries is too strong. If this condition is not controlled, it may put you at risk for serious complications.  Your personal target blood pressure may vary depending on your medical conditions, your age, and other factors. For most people, a normal blood pressure is less than 120/80.  Hypertension is treated with lifestyle changes, medicines, or a combination of both. Lifestyle changes include weight loss, eating a healthy, low-sodium diet, exercising more, and limiting alcohol.  This information is not intended to replace advice given to you by your health care provider. Make sure you discuss any questions you have with your health care provider.

## 2021-12-29 NOTE — ED PROVIDER NOTE - PHYSICAL EXAMINATION
Physical Exam    Vital Signs: I have reviewed the initial vital signs.  Constitutional: well-nourished, appears stated age, no acute distress  Eyes: Conjunctiva pink, Sclera clear, PERRLA, EOMI, no ptosis, no entrapment, no racoon eyes.  Cardiovascular: S1 and S2, regular rate, regular rhythm, well-perfused extremities, radial pulses equal and 2+, calves nonttp, equal in size  Respiratory: unlabored respiratory effort, speaking in full sentences, handling oral secretions, clear to auscultation bilaterally no wheezing, rales and rhonchi  Gastrointestinal: soft, non-tender abdomen, no pulsatile mass, normal bowl sounds  Musculoskeletal: supple neck,appropriately, no gross bony deformities or swelling.  Integumentary: warm, dry, no rashes, lacerations,  Neurologic: awake, alert, cranial nerves II-XII grossly intact, extremities’ motor and sensory functions grossly intact, no nystagmus, tremors, fasiculationsm facial droop, no ataxia, no dysmetria

## 2021-12-29 NOTE — ED PROVIDER NOTE - NS ED ROS FT
Constitutional: (-) fever (-) chills (-) (-) lightheadedness   Eyes/ENT: (-) blurry vision, (-) epistaxis (-) rhinorrhea (-) nasal congestion  Cardiovascular: (-) chest pain, (-) syncope (-) palpitations   Respiratory: (-) cough, (-) shortness of breath (-) pleurisy   Gastrointestinal: (-) vomiting, (-) diarrhea (-) abdominal pain (-) nausea (-) anorexia  Musculoskeletal: (-) neck pain, (-) back pain, (-) joint pain (-) joint swelling (-) painful ROM  Integumentary: (-) rash, (-) edema (-) lacerations (-) pruritis   Neurological: (+) headache, (-) altered mental status (-) LOC (-) dizziness (-) paresthesias (-) gait abnormalities

## 2021-12-29 NOTE — ED PROVIDER NOTE - PATIENT PORTAL LINK FT
You can access the FollowMyHealth Patient Portal offered by [facility] by registering at the following website: [email or web address]. By joining [employer]’s FollowMyHealth portal, you will also be able to view your health information using other applications (apps) compatible with our system.

## 2021-12-29 NOTE — ED PROVIDER NOTE - OBJECTIVE STATEMENT
31 y.o. F, pmh of migranes and nose bleeds , seen by ENT here for nose bleed at 6am today accompanied by R sided headache. took no meds PTA. BP upon evaluation was nonhypertension. Admits to poor diet.

## 2021-12-29 NOTE — ED PROVIDER NOTE - NSFOLLOWUPCLINICS_GEN_ALL_ED_FT
[facility]  Medicine  [address], [place]   Phone: [phone]  Fax:

## 2021-12-29 NOTE — ED ADULT NURSE NOTE - OBJECTIVE STATEMENT
31yr Female c/o HTN, headache, and a nosebleed at 6am that lasted a short time, but was an "alarming" amount of blood. Patient nosebleed controlled at this time.

## 2021-12-29 NOTE — ED PROVIDER NOTE - CLINICAL SUMMARY MEDICAL DECISION MAKING FREE TEXT BOX
30yo F presenting for R nare epistaxis and HTN. Per pt, spontaneous epistaxis this AM, self resolved. Also noted to have high BP today. Mild R sided headache. No vision changes, numbness/focal weakness, nausea/vomiting, cp, sob, LE pain/swelling, other acute complaints. Well appearing, NAD, non toxic. NCAT PERRLA EOMI neck supple non tender normal wob  WWPx4 neuro non focal no active epistaxis. has ent follow-up. Comfortable with discharge and follow-up outpatient, strict return precautions given. Endorses understanding of all of this and aware that they can return at any time for new or concerning symptoms. No further questions or concerns at this time

## 2022-01-11 ENCOUNTER — LABORATORY RESULT (OUTPATIENT)
Age: 32
End: 2022-01-11

## 2022-01-12 ENCOUNTER — APPOINTMENT (OUTPATIENT)
Dept: INTERNAL MEDICINE | Facility: CLINIC | Age: 32
End: 2022-01-12

## 2022-01-19 ENCOUNTER — APPOINTMENT (OUTPATIENT)
Dept: OBGYN | Facility: CLINIC | Age: 32
End: 2022-01-19

## 2022-01-24 ENCOUNTER — RESULT CHARGE (OUTPATIENT)
Age: 32
End: 2022-01-24

## 2022-01-24 ENCOUNTER — APPOINTMENT (OUTPATIENT)
Dept: CARDIOLOGY | Facility: CLINIC | Age: 32
End: 2022-01-24
Payer: MEDICAID

## 2022-01-24 VITALS
WEIGHT: 221 LBS | BODY MASS INDEX: 40.67 KG/M2 | HEART RATE: 119 BPM | DIASTOLIC BLOOD PRESSURE: 76 MMHG | SYSTOLIC BLOOD PRESSURE: 110 MMHG | HEIGHT: 62 IN | TEMPERATURE: 97.5 F

## 2022-01-24 DIAGNOSIS — F32.A ANXIETY DISORDER, UNSPECIFIED: ICD-10-CM

## 2022-01-24 DIAGNOSIS — F41.9 ANXIETY DISORDER, UNSPECIFIED: ICD-10-CM

## 2022-01-24 DIAGNOSIS — R00.2 PALPITATIONS: ICD-10-CM

## 2022-01-24 PROCEDURE — 93000 ELECTROCARDIOGRAM COMPLETE: CPT

## 2022-01-24 PROCEDURE — 99213 OFFICE O/P EST LOW 20 MIN: CPT

## 2022-01-24 NOTE — PHYSICAL EXAM
[Well Developed] : well developed [No Acute Distress] : no acute distress [Obese] : obese [Normal Conjunctiva] : normal conjunctiva [Normal] : soft, non-tender, no masses/organomegaly, normal bowel sounds [Normal Gait] : normal gait [No Edema] : no edema [No Cyanosis] : no cyanosis [No Rash] : no rash [Moves all extremities] : moves all extremities [No Focal Deficits] : no focal deficits [Alert and Oriented] : alert and oriented [de-identified] : No JVD

## 2022-01-24 NOTE — HISTORY OF PRESENT ILLNESS
[FreeTextEntry1] : 32 y/o female, with history as below, presents for evaluation of intermittent palpitations, feeling of tachycardia. No chest pain. No syncope.\par ECG - Sinus rhythm.\par EEvent monitor/MCOT - no arrhythmia.\par Echo - normal LV function.

## 2022-01-24 NOTE — ASSESSMENT
[FreeTextEntry1] : Palpitations, tachycardia\par Likely Sinus tachy\par No evidence of SVT\par \par Negative 2D echo and event monitor.\par TSH - normal\par Recommend hydration, avoid caffeine;\par May return PRN, f/u with PMD for evaluation of non-cardiac etiology of her symptoms.

## 2022-01-25 ENCOUNTER — APPOINTMENT (OUTPATIENT)
Dept: GASTROENTEROLOGY | Facility: CLINIC | Age: 32
End: 2022-01-25

## 2022-01-27 ENCOUNTER — OUTPATIENT (OUTPATIENT)
Dept: OUTPATIENT SERVICES | Facility: HOSPITAL | Age: 32
LOS: 1 days | Discharge: HOME | End: 2022-01-27

## 2022-01-27 ENCOUNTER — APPOINTMENT (OUTPATIENT)
Dept: OBGYN | Facility: CLINIC | Age: 32
End: 2022-01-27
Payer: MEDICAID

## 2022-01-27 VITALS — BODY MASS INDEX: 40.24 KG/M2 | SYSTOLIC BLOOD PRESSURE: 120 MMHG | WEIGHT: 220 LBS | DIASTOLIC BLOOD PRESSURE: 80 MMHG

## 2022-01-27 PROCEDURE — 99213 OFFICE O/P EST LOW 20 MIN: CPT

## 2022-01-27 RX ORDER — MEDROXYPROGESTERONE ACETATE 150 MG/ML
150 INJECTION, SUSPENSION INTRAMUSCULAR
Qty: 0 | Refills: 0 | Status: COMPLETED | OUTPATIENT
Start: 2022-01-27

## 2022-01-27 RX ADMIN — MEDROXYPROGESTERONE ACETATE 0 MG/ML: 150 INJECTION, SUSPENSION INTRAMUSCULAR at 00:00

## 2022-01-27 NOTE — HISTORY OF PRESENT ILLNESS
[No] : Patient does not have concerns regarding sex [FreeTextEntry1] : Pt here today for dmpa. Pt happy with method. No issues

## 2022-02-08 ENCOUNTER — NON-APPOINTMENT (OUTPATIENT)
Age: 32
End: 2022-02-08

## 2022-02-22 ENCOUNTER — APPOINTMENT (OUTPATIENT)
Dept: UROGYNECOLOGY | Facility: CLINIC | Age: 32
End: 2022-02-22
Payer: MEDICAID

## 2022-03-03 ENCOUNTER — APPOINTMENT (OUTPATIENT)
Dept: UROGYNECOLOGY | Facility: CLINIC | Age: 32
End: 2022-03-03
Payer: MEDICAID

## 2022-03-03 VITALS
BODY MASS INDEX: 40.48 KG/M2 | HEIGHT: 62 IN | WEIGHT: 220 LBS | DIASTOLIC BLOOD PRESSURE: 84 MMHG | SYSTOLIC BLOOD PRESSURE: 122 MMHG | HEART RATE: 130 BPM

## 2022-03-03 DIAGNOSIS — Z87.39 PERSONAL HISTORY OF OTHER DISEASES OF THE MUSCULOSKELETAL SYSTEM AND CONNECTIVE TISSUE: ICD-10-CM

## 2022-03-03 DIAGNOSIS — Z87.2 PERSONAL HISTORY OF DISEASES OF THE SKIN AND SUBCUTANEOUS TISSUE: ICD-10-CM

## 2022-03-03 DIAGNOSIS — Z01.419 ENCOUNTER FOR GYNECOLOGICAL EXAMINATION (GENERAL) (ROUTINE) W/OUT ABNORMAL FINDINGS: ICD-10-CM

## 2022-03-03 DIAGNOSIS — M23.91 UNSPECIFIED INTERNAL DERANGEMENT OF RIGHT KNEE: ICD-10-CM

## 2022-03-03 DIAGNOSIS — R10.32 LEFT LOWER QUADRANT PAIN: ICD-10-CM

## 2022-03-03 DIAGNOSIS — Z23 ENCOUNTER FOR IMMUNIZATION: ICD-10-CM

## 2022-03-03 DIAGNOSIS — Z30.42 ENCOUNTER FOR SURVEILLANCE OF INJECTABLE CONTRACEPTIVE: ICD-10-CM

## 2022-03-03 DIAGNOSIS — Z87.898 PERSONAL HISTORY OF OTHER SPECIFIED CONDITIONS: ICD-10-CM

## 2022-03-03 DIAGNOSIS — R20.8 OTHER DISTURBANCES OF SKIN SENSATION: ICD-10-CM

## 2022-03-03 DIAGNOSIS — K59.00 CONSTIPATION, UNSPECIFIED: ICD-10-CM

## 2022-03-03 DIAGNOSIS — S83.001A UNSPECIFIED SUBLUXATION OF RIGHT PATELLA, INITIAL ENCOUNTER: ICD-10-CM

## 2022-03-03 DIAGNOSIS — R10.2 PELVIC AND PERINEAL PAIN: ICD-10-CM

## 2022-03-03 PROCEDURE — 99215 OFFICE O/P EST HI 40 MIN: CPT

## 2022-03-03 RX ORDER — TROSPIUM CHLORIDE 60 MG/1
60 CAPSULE, EXTENDED RELEASE ORAL
Qty: 90 | Refills: 1 | Status: DISCONTINUED | COMMUNITY
Start: 2019-09-18 | End: 2022-03-03

## 2022-03-03 RX ORDER — POLYETHYLENE GLYCOL 3350 AND ELECTROLYTES WITH LEMON FLAVOR 236; 22.74; 6.74; 5.86; 2.97 G/4L; G/4L; G/4L; G/4L; G/4L
236 POWDER, FOR SOLUTION ORAL
Qty: 1 | Refills: 0 | Status: DISCONTINUED | COMMUNITY
Start: 2021-10-26 | End: 2022-03-03

## 2022-03-03 RX ORDER — FLUCONAZOLE 150 MG/1
150 TABLET ORAL
Qty: 1 | Refills: 6 | Status: DISCONTINUED | COMMUNITY
Start: 2021-11-08 | End: 2022-03-03

## 2022-03-05 NOTE — COUNSELING
[FreeTextEntry1] : \par Please continue to take the flexeril 1-2 tablets at night. I sent in refills\par \par Please continue to take the hydroxyzine nightly for the bladder pain. I sent in refills\par \par Please stop the trospium\par \par Please start solfencin nightly for the bladder control. \par \par Please call my office if you have any issues with the cost or side effects of the medication.\par \par Please increase your water intake to at least 3 bottles of water per day\par \par Please avoid liquid intake two hours prior to bedtime\par \par Please start using Miralax (over the counter powder) every day for the constipation\par \par Schedule 3 month follow up with my PA, Eugenia ([facility] [place])

## 2022-03-05 NOTE — DISCUSSION/SUMMARY
[FreeTextEntry1] : \par Urinary frequency-\par Refills for the hydroxyzine 25mg nightly provided.\par \par Myalgia-\par Refills for flexeril 5mg 1-2 tablets nightly was provided.\par \par Mixed incontinence-\par The pathophysiology of the above condition was discussed with the patient. The patient was given information and education on pelvic floor muscle exercises/rehabilitation, avoidance of dietary bladder irritants, and other strategies to improve bladder control, such as scheduled voiding. She was counseled regarding further management strategies for overactive bladder and urge incontinence including pelvic floor physical therapy, medications or surgical management. The patient voiced understanding and agrees with increasing her water intake, working on constipation control and further medical management. The risks and benefits of vesicare was reviewed.\par \par Constipation-\par Advised starting miralax daily for constipation control.

## 2022-03-05 NOTE — HISTORY OF PRESENT ILLNESS
[FreeTextEntry1] : \par The patient is here for follow up for her mixed incontinence and bladder pain/urinary frequency\par Last seen on 9/16/2020 where she wanted refills for trospium ER 60mg, hydroxyzine 25mg nightly and flexeril 5mg 1-2 tablets per day\par \par Today, the patient reports that her pelvic pain and bladder pain are well controlled with flexeril and hydroxyzine\par In regards to her mixed incontinence, she is mostly bothered by her nocturia. Does not feel like the trospium is working anymore.\par s/p trospium 20mg twice a day, would only remember to take it once a day\par \par Patient is on gabapentin 100mg tid for anxiety

## 2022-04-21 ENCOUNTER — APPOINTMENT (OUTPATIENT)
Dept: OBGYN | Facility: CLINIC | Age: 32
End: 2022-04-21
Payer: MEDICAID

## 2022-04-21 ENCOUNTER — OUTPATIENT (OUTPATIENT)
Dept: OUTPATIENT SERVICES | Facility: HOSPITAL | Age: 32
LOS: 1 days | Discharge: HOME | End: 2022-04-21

## 2022-04-21 VITALS
WEIGHT: 232 LBS | HEIGHT: 62 IN | SYSTOLIC BLOOD PRESSURE: 130 MMHG | BODY MASS INDEX: 42.69 KG/M2 | DIASTOLIC BLOOD PRESSURE: 82 MMHG

## 2022-04-21 DIAGNOSIS — N94.9 UNSPECIFIED CONDITION ASSOCIATED WITH FEMALE GENITAL ORGANS AND MENSTRUAL CYCLE: ICD-10-CM

## 2022-04-21 PROCEDURE — 99213 OFFICE O/P EST LOW 20 MIN: CPT

## 2022-04-21 RX ORDER — MEDROXYPROGESTERONE ACETATE 150 MG/ML
150 INJECTION, SUSPENSION INTRAMUSCULAR
Qty: 0 | Refills: 0 | Status: COMPLETED | OUTPATIENT
Start: 2022-04-21

## 2022-04-21 RX ADMIN — MEDROXYPROGESTERONE ACETATE 0 MG/ML: 150 INJECTION, SUSPENSION INTRAMUSCULAR at 00:00

## 2022-04-21 NOTE — PLAN
[FreeTextEntry1] : Encounter for dmpa, sti testing and vaginal lump\par no lump seen\par pt to rv as soon as it appears\par dmpa given\par pt sent for sti testing\par pt to rv in 3 months or prn

## 2022-04-21 NOTE — PHYSICAL EXAM
[Chaperone Present] : A chaperone was present in the examining room during all aspects of the physical examination [Appropriately responsive] : appropriately responsive [Labia Majora] : normal [Labia Minora] : normal [Normal] : normal [Uterine Adnexae] : normal [FreeTextEntry1] : no external lumps palpable

## 2022-04-22 LAB
C TRACH RRNA SPEC QL NAA+PROBE: NOT DETECTED
HBV SURFACE AG SER QL: NONREACTIVE
HCV AB SER QL: NONREACTIVE
HCV S/CO RATIO: 0.13 S/CO
HIV1+2 AB SPEC QL IA.RAPID: NONREACTIVE
N GONORRHOEA RRNA SPEC QL NAA+PROBE: NOT DETECTED
SOURCE AMPLIFICATION: NORMAL
T PALLIDUM AB SER QL IA: NEGATIVE

## 2022-04-24 ENCOUNTER — TRANSCRIPTION ENCOUNTER (OUTPATIENT)
Age: 32
End: 2022-04-24

## 2022-05-04 ENCOUNTER — EMERGENCY (EMERGENCY)
Facility: HOSPITAL | Age: 32
LOS: 0 days | Discharge: HOME | End: 2022-05-04
Attending: EMERGENCY MEDICINE | Admitting: EMERGENCY MEDICINE
Payer: MEDICAID

## 2022-05-04 VITALS
DIASTOLIC BLOOD PRESSURE: 98 MMHG | RESPIRATION RATE: 18 BRPM | OXYGEN SATURATION: 99 % | SYSTOLIC BLOOD PRESSURE: 184 MMHG | TEMPERATURE: 100 F | WEIGHT: 220.02 LBS | HEIGHT: 62 IN | HEART RATE: 108 BPM

## 2022-05-04 VITALS — HEART RATE: 87 BPM

## 2022-05-04 DIAGNOSIS — M54.50 LOW BACK PAIN, UNSPECIFIED: ICD-10-CM

## 2022-05-04 DIAGNOSIS — G89.29 OTHER CHRONIC PAIN: ICD-10-CM

## 2022-05-04 DIAGNOSIS — Z91.09 OTHER ALLERGY STATUS, OTHER THAN TO DRUGS AND BIOLOGICAL SUBSTANCES: ICD-10-CM

## 2022-05-04 PROCEDURE — 99284 EMERGENCY DEPT VISIT MOD MDM: CPT

## 2022-05-04 RX ORDER — KETOROLAC TROMETHAMINE 30 MG/ML
30 SYRINGE (ML) INJECTION ONCE
Refills: 0 | Status: DISCONTINUED | OUTPATIENT
Start: 2022-05-04 | End: 2022-05-04

## 2022-05-04 RX ORDER — DEXAMETHASONE 0.5 MG/5ML
10 ELIXIR ORAL ONCE
Refills: 0 | Status: COMPLETED | OUTPATIENT
Start: 2022-05-04 | End: 2022-05-04

## 2022-05-04 RX ADMIN — Medication 30 MILLIGRAM(S): at 14:53

## 2022-05-04 RX ADMIN — Medication 10 MILLIGRAM(S): at 14:53

## 2022-05-04 NOTE — ED PROVIDER NOTE - NSFOLLOWUPINSTRUCTIONS_ED_ALL_ED_FT
Back Pain    Back pain is very common in adults. The cause of back pain is rarely dangerous and the pain often gets better over time. The cause of your back pain may not be known and may include strain of muscles or ligaments, degeneration of the spinal disks, or arthritis. Occasionally the pain may radiate down your leg(s). Over-the-counter medicines to reduce pain and inflammation are often the most helpful. Stretching and remaining active frequently helps the healing process.     SEEK IMMEDIATE MEDICAL CARE IF YOU HAVE ANY OF THE FOLLOWING SYMPTOMS: bowel or bladder control problems, unusual weakness or numbness in your arms or legs, nausea or vomiting, abdominal pain, fever, dizziness/lightheadedness.    Our Emergency Department Referral Coordinators will be reaching out to you in the next 24-48 hours from 9:00am to 5:00pm with a follow up appointment. Please expect a phone call from the hospital in that time frame. If you do not receive a call or if you have any questions or concerns, you can reach them at [phone] or [phone].    [facility]    [address]  [address]  [place], [place]  [phone]    [address]  [place], [place]  [phone]    [address]  [address] Floor  [place], [place]   [phone]    [address]  [place], [place]  [phone]    [address]  [place], [place]  [phone]    [address]  [address]  [place], [place]  [phone]    [address] [place], [place]  [phone]

## 2022-05-04 NOTE — ED PROVIDER NOTE - NS ED ATTENDING STATEMENT MOD
This was a shared visit with the ADELE. I reviewed and verified the documentation and independently performed the documented:

## 2022-05-04 NOTE — ED PROVIDER NOTE - PATIENT PORTAL LINK FT
You can access the FollowMyHealth Patient Portal offered by [facility] by registering at the following website: [email or web address]. By joining "[employer]’s FollowMyHealth portal, you will also be able to view your health information using other applications (apps) compatible with our system.

## 2022-05-04 NOTE — ED PROVIDER NOTE - ATTENDING APP SHARED VISIT CONTRIBUTION OF CARE
33 yo f with obesity, c/o L lower back pain.  pt says has been chronic.  worse in the last few weeks.  denies trauma or falls.  no hematuria.  no midline pain.  says f/u with urg care and given motrin and tizanidine that she only takes at night.  no abd pain.  exam: nad, ncat, perrl, eomi, mmm, rrr, ctab, abd soft, nt, nd, ttp L paravert muscle in lumbar muscle, no midline ttp in t/l spine imp: pt with L lower back pain, pt to f/u with PT/rehab

## 2022-05-04 NOTE — ED PROVIDER NOTE - OBJECTIVE STATEMENT
33 yo female with no pertinent pmh presents c/o lower back pain. pt states to have experienced the pain intermittently for several years but has experienced worsening this past month. pt states notes aggravation with positional changes. denies any urinary retention/incontinence or saddle anesthesias. denies any other symptoms including fevers, chill, headache, recent illness/travel, cough, abdominal pain, chest pain, or SOB.

## 2022-05-05 ENCOUNTER — NON-APPOINTMENT (OUTPATIENT)
Age: 32
End: 2022-05-05

## 2022-05-06 RX ORDER — CYCLOBENZAPRINE HYDROCHLORIDE 10 MG/1
1 TABLET, FILM COATED ORAL
Qty: 21 | Refills: 0
Start: 2022-05-06 | End: 2022-05-12

## 2022-05-12 ENCOUNTER — APPOINTMENT (OUTPATIENT)
Dept: INTERNAL MEDICINE | Facility: CLINIC | Age: 32
End: 2022-05-12
Payer: MEDICAID

## 2022-05-12 ENCOUNTER — OUTPATIENT (OUTPATIENT)
Dept: OUTPATIENT SERVICES | Facility: HOSPITAL | Age: 32
LOS: 1 days | Discharge: HOME | End: 2022-05-12

## 2022-05-12 ENCOUNTER — OUTPATIENT (OUTPATIENT)
Dept: OUTPATIENT SERVICES | Facility: HOSPITAL | Age: 32
LOS: 1 days | Discharge: HOME | End: 2022-05-12
Payer: MEDICAID

## 2022-05-12 VITALS
BODY MASS INDEX: 42.33 KG/M2 | HEART RATE: 102 BPM | DIASTOLIC BLOOD PRESSURE: 94 MMHG | SYSTOLIC BLOOD PRESSURE: 139 MMHG | WEIGHT: 230 LBS | HEIGHT: 62 IN | OXYGEN SATURATION: 99 %

## 2022-05-12 DIAGNOSIS — E66.9 OBESITY, UNSPECIFIED: ICD-10-CM

## 2022-05-12 DIAGNOSIS — L30.9 DERMATITIS, UNSPECIFIED: ICD-10-CM

## 2022-05-12 DIAGNOSIS — Z00.00 ENCOUNTER FOR GENERAL ADULT MEDICAL EXAMINATION W/OUT ABNORMAL FINDINGS: ICD-10-CM

## 2022-05-12 DIAGNOSIS — J45.20 MILD INTERMITTENT ASTHMA, UNCOMPLICATED: ICD-10-CM

## 2022-05-12 DIAGNOSIS — H93.8X2 OTHER SPECIFIED DISORDERS OF LEFT EAR: ICD-10-CM

## 2022-05-12 DIAGNOSIS — R20.2 PARESTHESIA OF SKIN: ICD-10-CM

## 2022-05-12 DIAGNOSIS — M54.9 DORSALGIA, UNSPECIFIED: ICD-10-CM

## 2022-05-12 PROCEDURE — 72110 X-RAY EXAM L-2 SPINE 4/>VWS: CPT | Mod: 26

## 2022-05-12 PROCEDURE — 99214 OFFICE O/P EST MOD 30 MIN: CPT | Mod: GC

## 2022-05-12 PROCEDURE — 99204 OFFICE O/P NEW MOD 45 MIN: CPT | Mod: GC

## 2022-05-12 RX ORDER — TACROLIMUS 1 MG/G
0.1 OINTMENT TOPICAL
Qty: 1 | Refills: 0 | Status: COMPLETED | COMMUNITY
Start: 2018-04-03 | End: 2022-05-12

## 2022-05-12 RX ORDER — TRIAMCINOLONE ACETONIDE 1 MG/G
0.1 CREAM TOPICAL DAILY
Qty: 1 | Refills: 0 | Status: COMPLETED | COMMUNITY
Start: 2018-01-23 | End: 2022-05-12

## 2022-05-12 RX ORDER — POLYETHYLENE GLYCOL 3350 17 G/17G
17 POWDER, FOR SOLUTION ORAL TWICE DAILY
Qty: 2 | Refills: 3 | Status: COMPLETED | COMMUNITY
Start: 2021-10-26 | End: 2022-05-12

## 2022-05-12 RX ORDER — TRAZODONE HYDROCHLORIDE 100 MG/1
100 TABLET ORAL
Refills: 0 | Status: COMPLETED | COMMUNITY
Start: 2019-01-04 | End: 2022-05-12

## 2022-05-12 RX ORDER — GABAPENTIN 300 MG/1
300 CAPSULE ORAL 3 TIMES DAILY
Refills: 0 | Status: COMPLETED | COMMUNITY
Start: 2019-01-04 | End: 2022-05-12

## 2022-05-12 RX ORDER — BUDESONIDE AND FORMOTEROL FUMARATE DIHYDRATE 80; 4.5 UG/1; UG/1
80-4.5 AEROSOL RESPIRATORY (INHALATION) TWICE DAILY
Qty: 3 | Refills: 5 | Status: COMPLETED | OUTPATIENT
Start: 2017-12-28 | End: 2022-05-12

## 2022-05-12 RX ORDER — ZOLPIDEM TARTRATE 5 MG/1
5 TABLET, FILM COATED ORAL
Refills: 0 | Status: COMPLETED | COMMUNITY
Start: 2019-01-04 | End: 2022-05-12

## 2022-05-12 NOTE — HISTORY OF PRESENT ILLNESS
[FreeTextEntry1] : back pain  [de-identified] : 32 year old  F with sickle cell trait, mixed incontinence  and intermittent asthma presents to clinic for follow up visit. Patient complains of back pain. She said that this pain has been going on intermittently for about two months but is now constant . She had an episode about 8 years ago where she felt a splitting pain down her spine ; which she described as a " lightening bolt going down her spine. Currently she has a constant low back pain 8/10 which she describes as " something is pressing on my back" and every-time she straightens her back;  she feels a shooting 10/10 pain down her spine.  She has been to the urgent care multiple times and has been treated with muscle relaxant. She stated that in the past ; she had fallen down and hit her tailbone ( over 15 yrs ago) and ever since she has had this pain. denies any alleviating factors, recent trauma, fecal incontinence, numbness and tingling.

## 2022-05-12 NOTE — REVIEW OF SYSTEMS
[Incontinence] : incontinence [Back Pain] : back pain [Headache] : headache [Dizziness] : dizziness [Anxiety] : anxiety [Fever] : no fever [Chills] : no chills [Discharge] : no discharge [Pain] : no pain [Chest Pain] : no chest pain [Palpitations] : no palpitations [Shortness Of Breath] : no shortness of breath [Wheezing] : no wheezing [Abdominal Pain] : no abdominal pain [Nausea] : no nausea [Diarrhea] : diarrhea [Joint Pain] : no joint pain [FreeTextEntry4] : left ear pressure

## 2022-05-12 NOTE — PHYSICAL EXAM
[No Acute Distress] : no acute distress [Well Nourished] : well nourished [Well Developed] : well developed [Well-Appearing] : well-appearing [Normal Sclera/Conjunctiva] : normal sclera/conjunctiva [Normal Outer Ear/Nose] : the outer ears and nose were normal in appearance [Supple] : supple [No Respiratory Distress] : no respiratory distress  [No Accessory Muscle Use] : no accessory muscle use [Clear to Auscultation] : lungs were clear to auscultation bilaterally [Normal Rate] : normal rate  [Regular Rhythm] : with a regular rhythm [Normal S1, S2] : normal S1 and S2 [No Murmur] : no murmur heard [No Edema] : there was no peripheral edema [Soft] : abdomen soft [Non Tender] : non-tender [Non-distended] : non-distended [No CVA Tenderness] : no CVA  tenderness [No Spinal Tenderness] : no spinal tenderness [No Joint Swelling] : no joint swelling [Grossly Normal Strength/Tone] : grossly normal strength/tone [No Rash] : no rash [Coordination Grossly Intact] : coordination grossly intact [No Focal Deficits] : no focal deficits [Normal Affect] : the affect was normal [Normal Insight/Judgement] : insight and judgment were intact [Normal TMs] : both tympanic membranes were normal [de-identified] : ROM of back limited on extension ,  [de-identified] : walking limited to pain

## 2022-05-12 NOTE — ASSESSMENT
[FreeTextEntry1] : 32 F with sickle cell trait, mixed incontinence and intermittent asthma presents to clinic for follow up visit.\par \par #Back pain\par - c/w naproxen 500 BID AND cyclobenzaprine \par - XRAY LS ordered \par \par #Mixed incontinence \par #Urinary frequency-\par - c/w hydroxyzine 25mg nightly and pelvic muscle exercise \par - follows with urogyn \par \par #Mild persistent Asthma \par - albuterol PRN \par \par # Anxiety\par - does not know the name of her medication \par - follows with psychiatry\par \par #Allergy \par - pt is saying she has multiple allergies \par - she would like to follow up with an allergist \par - allergy referral given \par \par #Ear popping on the left \par - Patient states she recently had sinus infection\par - ENT referral given\par \par #Eczema \par - patient would like to see a dermatologist for eczema on her head \par - dermatology referral given \par \par #Tingling of the left wrist \par - states that it started about a month ago \par -?? carpel tunnel syndrome associated with lifting heavy things \par - Neurology referral given  \par \par # HCM\par - Routine labs ordered\par - obgyn referral given for pap smear \par rto in 1month and prn\par

## 2022-05-18 DIAGNOSIS — E66.9 OBESITY, UNSPECIFIED: ICD-10-CM

## 2022-05-18 DIAGNOSIS — J45.20 MILD INTERMITTENT ASTHMA, UNCOMPLICATED: ICD-10-CM

## 2022-05-18 DIAGNOSIS — T78.40XD ALLERGY, UNSPECIFIED, SUBSEQUENT ENCOUNTER: ICD-10-CM

## 2022-05-18 DIAGNOSIS — H93.8X2 OTHER SPECIFIED DISORDERS OF LEFT EAR: ICD-10-CM

## 2022-05-18 DIAGNOSIS — L30.9 DERMATITIS, UNSPECIFIED: ICD-10-CM

## 2022-05-18 DIAGNOSIS — N39.46 MIXED INCONTINENCE: ICD-10-CM

## 2022-05-18 DIAGNOSIS — R20.2 PARESTHESIA OF SKIN: ICD-10-CM

## 2022-05-18 DIAGNOSIS — M54.9 DORSALGIA, UNSPECIFIED: ICD-10-CM

## 2022-05-18 DIAGNOSIS — Z00.00 ENCOUNTER FOR GENERAL ADULT MEDICAL EXAMINATION WITHOUT ABNORMAL FINDINGS: ICD-10-CM

## 2022-06-09 ENCOUNTER — APPOINTMENT (OUTPATIENT)
Dept: UROGYNECOLOGY | Facility: CLINIC | Age: 32
End: 2022-06-09
Payer: MEDICAID

## 2022-06-09 VITALS
WEIGHT: 225 LBS | DIASTOLIC BLOOD PRESSURE: 77 MMHG | BODY MASS INDEX: 41.41 KG/M2 | HEART RATE: 116 BPM | HEIGHT: 62 IN | SYSTOLIC BLOOD PRESSURE: 117 MMHG

## 2022-06-09 PROCEDURE — 99214 OFFICE O/P EST MOD 30 MIN: CPT

## 2022-06-09 NOTE — DISCUSSION/SUMMARY
[FreeTextEntry1] : Urinary frequency-\par Continue Hydroxyzine 25mg QHS\par \par Myalgia of pelvic floor\par Continue Flexeril 5mg 1-2 tablets QHS\par \par Mixed Incontinence\par Rx Myrbetriq 25mg QD 30 days with 1 refill\par We will call pt in 6 weeks for  med check

## 2022-06-09 NOTE — HISTORY OF PRESENT ILLNESS
[FreeTextEntry1] : Patient is here for 3 months med check for mixed incontinence and bladder pain. \par Last seen on 3/3/22 for med check.\par \par s/p trospium 20mg twice a day, would only remember to take it once a day\par Gabapentin 100mg tid for anxiety \par Flexeril 5 mg 1-2 tabs at bedtime for myalgia\par Hydroxyzine 25 mg at bedtime for urinary frequency\par S/p Trospium ER 60mg QD: ? stopped working\par \par Vesicare 5mg: blurred visition\par \par Today, patient states she is happy with Flexeril 5 mg 1-2 tabs QHS. Hydroxyzine 25mg QHS and is noticing improvement. Denies side effects. Patient does not feel she has an infection. Would like  a new medication for OAB symptoms. \par \par \par

## 2022-06-09 NOTE — COUNSELING
[FreeTextEntry1] : If you feel like you have an infection it is important for you to call our office and we will arrange testing of your urine.\par \par Please continue taking Flexeril 5mg and Hydroxyzine 25mg for myalgia and frequency. \par \par Please begin taking  Myrbetriq 25 mg once a day. It takes up to 6 weeks to go into full effect. Please  your refill when you complete the 1st bottle.\par \par Please call my office if you have any issues with the cost or side effects of the medication. \par \par We will call you in 6 weeks to check how the medication is helping you. \par Schedule a 6 months follow up med check appointment.\par

## 2022-06-16 ENCOUNTER — APPOINTMENT (OUTPATIENT)
Dept: INTERNAL MEDICINE | Facility: CLINIC | Age: 32
End: 2022-06-16

## 2022-06-17 ENCOUNTER — OUTPATIENT (OUTPATIENT)
Dept: OUTPATIENT SERVICES | Facility: HOSPITAL | Age: 32
LOS: 1 days | Discharge: HOME | End: 2022-06-17

## 2022-07-14 ENCOUNTER — NON-APPOINTMENT (OUTPATIENT)
Age: 32
End: 2022-07-14

## 2022-07-14 ENCOUNTER — APPOINTMENT (OUTPATIENT)
Dept: OBGYN | Facility: CLINIC | Age: 32
End: 2022-07-14

## 2022-07-14 ENCOUNTER — OUTPATIENT (OUTPATIENT)
Dept: OUTPATIENT SERVICES | Facility: HOSPITAL | Age: 32
LOS: 1 days | Discharge: HOME | End: 2022-07-14

## 2022-07-14 VITALS
BODY MASS INDEX: 42.33 KG/M2 | DIASTOLIC BLOOD PRESSURE: 80 MMHG | WEIGHT: 230 LBS | SYSTOLIC BLOOD PRESSURE: 118 MMHG | HEIGHT: 62 IN

## 2022-07-14 LAB
25(OH)D3 SERPL-MCNC: 25 NG/ML
ALBUMIN SERPL ELPH-MCNC: 4.3 G/DL
ALP BLD-CCNC: 59 U/L
ALT SERPL-CCNC: 10 U/L
ANION GAP SERPL CALC-SCNC: 12 MMOL/L
AST SERPL-CCNC: 13 U/L
BASOPHILS # BLD AUTO: 0.03 K/UL
BASOPHILS NFR BLD AUTO: 0.4 %
BILIRUB SERPL-MCNC: <0.2 MG/DL
BUN SERPL-MCNC: 8 MG/DL
CALCIUM SERPL-MCNC: 9.4 MG/DL
CHLORIDE SERPL-SCNC: 106 MMOL/L
CHOLEST SERPL-MCNC: 153 MG/DL
CO2 SERPL-SCNC: 23 MMOL/L
CREAT SERPL-MCNC: 0.7 MG/DL
EOSINOPHIL # BLD AUTO: 0.18 K/UL
EOSINOPHIL NFR BLD AUTO: 2.4 %
ESTIMATED AVERAGE GLUCOSE: 117 MG/DL
GLUCOSE SERPL-MCNC: 80 MG/DL
HBA1C MFR BLD HPLC: 5.7 %
HCT VFR BLD CALC: 37.5 %
HDLC SERPL-MCNC: 37 MG/DL
HGB BLD-MCNC: 11.6 G/DL
IMM GRANULOCYTES NFR BLD AUTO: 0.4 %
LDLC SERPL CALC-MCNC: 94 MG/DL
LYMPHOCYTES # BLD AUTO: 2.51 K/UL
LYMPHOCYTES NFR BLD AUTO: 32.9 %
MAN DIFF?: NORMAL
MCHC RBC-ENTMCNC: 24 PG
MCHC RBC-ENTMCNC: 30.9 G/DL
MCV RBC AUTO: 77.6 FL
MONOCYTES # BLD AUTO: 0.85 K/UL
MONOCYTES NFR BLD AUTO: 11.1 %
NEUTROPHILS # BLD AUTO: 4.04 K/UL
NEUTROPHILS NFR BLD AUTO: 52.8 %
NONHDLC SERPL-MCNC: 116 MG/DL
PLATELET # BLD AUTO: 548 K/UL
POTASSIUM SERPL-SCNC: 4.1 MMOL/L
PROT SERPL-MCNC: 7.3 G/DL
RBC # BLD: 4.83 M/UL
RBC # FLD: 14.8 %
SODIUM SERPL-SCNC: 141 MMOL/L
TRIGL SERPL-MCNC: 112 MG/DL
TSH SERPL-ACNC: 3.4 UIU/ML
WBC # FLD AUTO: 7.64 K/UL

## 2022-07-14 PROCEDURE — 99213 OFFICE O/P EST LOW 20 MIN: CPT

## 2022-07-14 RX ORDER — MEDROXYPROGESTERONE ACETATE 150 MG/ML
150 INJECTION, SUSPENSION INTRAMUSCULAR
Qty: 0 | Refills: 0 | Status: COMPLETED | OUTPATIENT
Start: 2022-07-14

## 2022-07-14 RX ADMIN — MEDROXYPROGESTERONE ACETATE 0 MG/ML: 150 INJECTION, SUSPENSION INTRAMUSCULAR at 00:00

## 2022-07-14 NOTE — PHYSICAL EXAM
[Chaperone Present] : A chaperone was present in the examining room during all aspects of the physical examination [Appropriately responsive] : appropriately responsive [Oriented x3] : oriented x3

## 2022-07-14 NOTE — PLAN
[FreeTextEntry1] : DMPA 150mg im given right deltoid\par pt to see dermatologist regarding skin on breast\par pt to rv in 3 months for dmpa

## 2022-07-21 ENCOUNTER — NON-APPOINTMENT (OUTPATIENT)
Age: 32
End: 2022-07-21

## 2022-07-21 RX ORDER — SOLIFENACIN SUCCINATE 10 MG/1
10 TABLET ORAL
Qty: 30 | Refills: 2 | Status: DISCONTINUED | COMMUNITY
Start: 2022-03-03 | End: 2022-07-21

## 2022-07-21 RX ORDER — MIRABEGRON 25 MG/1
25 TABLET, FILM COATED, EXTENDED RELEASE ORAL
Qty: 30 | Refills: 1 | Status: DISCONTINUED | COMMUNITY
Start: 2022-06-09 | End: 2022-07-21

## 2022-07-22 ENCOUNTER — NON-APPOINTMENT (OUTPATIENT)
Age: 32
End: 2022-07-22

## 2022-09-06 ENCOUNTER — NON-APPOINTMENT (OUTPATIENT)
Age: 32
End: 2022-09-06

## 2022-09-06 RX ORDER — DARIFENACIN HYDROBROMIDE 7.5 MG/1
7.5 TABLET, EXTENDED RELEASE ORAL
Qty: 30 | Refills: 1 | Status: DISCONTINUED | COMMUNITY
Start: 2022-07-21 | End: 2022-09-06

## 2022-09-11 ENCOUNTER — NON-APPOINTMENT (OUTPATIENT)
Age: 32
End: 2022-09-11

## 2022-09-17 ENCOUNTER — APPOINTMENT (OUTPATIENT)
Dept: INTERNAL MEDICINE | Facility: CLINIC | Age: 32
End: 2022-09-17

## 2022-09-17 ENCOUNTER — OUTPATIENT (OUTPATIENT)
Dept: OUTPATIENT SERVICES | Facility: HOSPITAL | Age: 32
LOS: 1 days | Discharge: HOME | End: 2022-09-17

## 2022-09-17 VITALS
SYSTOLIC BLOOD PRESSURE: 135 MMHG | TEMPERATURE: 97.9 F | BODY MASS INDEX: 40.53 KG/M2 | HEIGHT: 62 IN | DIASTOLIC BLOOD PRESSURE: 96 MMHG | OXYGEN SATURATION: 100 % | HEART RATE: 92 BPM | WEIGHT: 220.25 LBS

## 2022-09-17 DIAGNOSIS — L30.9 DERMATITIS, UNSPECIFIED: ICD-10-CM

## 2022-09-17 DIAGNOSIS — G89.29 PAIN IN RIGHT FOOT: ICD-10-CM

## 2022-09-17 DIAGNOSIS — J34.9 UNSPECIFIED DISORDER OF NOSE AND NASAL SINUSES: ICD-10-CM

## 2022-09-17 DIAGNOSIS — M79.671 PAIN IN RIGHT FOOT: ICD-10-CM

## 2022-09-17 DIAGNOSIS — T78.40XD ALLERGY, UNSPECIFIED, SUBSEQUENT ENCOUNTER: ICD-10-CM

## 2022-09-17 DIAGNOSIS — M54.9 DORSALGIA, UNSPECIFIED: ICD-10-CM

## 2022-09-17 LAB
ALBUMIN SERPL ELPH-MCNC: 4.1 G/DL
ALP BLD-CCNC: 62 U/L
ALT SERPL-CCNC: 9 U/L
ANION GAP SERPL CALC-SCNC: 14 MMOL/L
AST SERPL-CCNC: 13 U/L
BASOPHILS # BLD AUTO: 0.03 K/UL
BASOPHILS NFR BLD AUTO: 0.3 %
BILIRUB SERPL-MCNC: 0.2 MG/DL
BUN SERPL-MCNC: 8 MG/DL
CALCIUM SERPL-MCNC: 9.3 MG/DL
CHLORIDE SERPL-SCNC: 106 MMOL/L
CHOLEST SERPL-MCNC: 135 MG/DL
CO2 SERPL-SCNC: 20 MMOL/L
CREAT SERPL-MCNC: 0.8 MG/DL
EGFR: 102 ML/MIN/1.73M2
EOSINOPHIL # BLD AUTO: 0.2 K/UL
EOSINOPHIL NFR BLD AUTO: 2.3 %
ESTIMATED AVERAGE GLUCOSE: 111 MG/DL
GLUCOSE SERPL-MCNC: 72 MG/DL
HBA1C MFR BLD HPLC: 5.5 %
HCT VFR BLD CALC: 36.9 %
HDLC SERPL-MCNC: 43 MG/DL
HGB BLD-MCNC: 11.6 G/DL
IMM GRANULOCYTES NFR BLD AUTO: 0.3 %
LDLC SERPL CALC-MCNC: 80 MG/DL
LYMPHOCYTES # BLD AUTO: 2.02 K/UL
LYMPHOCYTES NFR BLD AUTO: 23 %
MAN DIFF?: NORMAL
MCHC RBC-ENTMCNC: 24 PG
MCHC RBC-ENTMCNC: 31.4 G/DL
MCV RBC AUTO: 76.4 FL
MONOCYTES # BLD AUTO: 0.68 K/UL
MONOCYTES NFR BLD AUTO: 7.7 %
NEUTROPHILS # BLD AUTO: 5.82 K/UL
NEUTROPHILS NFR BLD AUTO: 66.4 %
NONHDLC SERPL-MCNC: 92 MG/DL
PLATELET # BLD AUTO: 525 K/UL
POTASSIUM SERPL-SCNC: 4.9 MMOL/L
PROT SERPL-MCNC: 7.2 G/DL
RBC # BLD: 4.83 M/UL
RBC # FLD: 15.1 %
SODIUM SERPL-SCNC: 140 MMOL/L
TRIGL SERPL-MCNC: 61 MG/DL
TSH SERPL-ACNC: 1.31 UIU/ML
WBC # FLD AUTO: 8.78 K/UL

## 2022-09-17 PROCEDURE — 99213 OFFICE O/P EST LOW 20 MIN: CPT | Mod: GC

## 2022-09-17 RX ORDER — ALBUTEROL SULFATE 90 UG/1
108 (90 BASE) INHALANT RESPIRATORY (INHALATION) EVERY 6 HOURS
Qty: 1 | Refills: 5 | Status: ACTIVE | COMMUNITY
Start: 2017-05-03 | End: 1900-01-01

## 2022-09-17 NOTE — ASSESSMENT
[FreeTextEntry1] : asthma stablec/w current meds\par eczema\par recurrent epistaxis refer to otolaryngology\par chronic backpain and numbness left hand r/o cts\par rto in 3 months and prn

## 2022-09-17 NOTE — HISTORY OF PRESENT ILLNESS
[FreeTextEntry1] : f/u complains of low back pain andleft hand numbnessand recurrent epistaxis and eczema scalp request dermatology fererral

## 2022-09-17 NOTE — PHYSICAL EXAM
[Normal] : normal rate, regular rhythm, normal S1 and S2 and no murmur heard [de-identified] : no active bleed [de-identified] : low back spasm [de-identified] : eczema scalp [de-identified] : numbnessleft hand

## 2022-09-19 DIAGNOSIS — T78.40XD ALLERGY, UNSPECIFIED, SUBSEQUENT ENCOUNTER: ICD-10-CM

## 2022-09-19 DIAGNOSIS — M54.9 DORSALGIA, UNSPECIFIED: ICD-10-CM

## 2022-09-19 DIAGNOSIS — M79.671 PAIN IN RIGHT FOOT: ICD-10-CM

## 2022-09-19 DIAGNOSIS — L30.9 DERMATITIS, UNSPECIFIED: ICD-10-CM

## 2022-09-19 DIAGNOSIS — J34.9 UNSPECIFIED DISORDER OF NOSE AND NASAL SINUSES: ICD-10-CM

## 2022-09-29 ENCOUNTER — APPOINTMENT (OUTPATIENT)
Dept: OBGYN | Facility: CLINIC | Age: 32
End: 2022-09-29

## 2022-09-29 ENCOUNTER — OUTPATIENT (OUTPATIENT)
Dept: OUTPATIENT SERVICES | Facility: HOSPITAL | Age: 32
LOS: 1 days | Discharge: HOME | End: 2022-09-29

## 2022-09-29 VITALS — WEIGHT: 220 LBS | DIASTOLIC BLOOD PRESSURE: 80 MMHG | SYSTOLIC BLOOD PRESSURE: 120 MMHG | BODY MASS INDEX: 40.24 KG/M2

## 2022-09-29 PROCEDURE — 99212 OFFICE O/P EST SF 10 MIN: CPT

## 2022-09-29 RX ORDER — MEDROXYPROGESTERONE ACETATE 150 MG/ML
150 INJECTION, SUSPENSION INTRAMUSCULAR
Qty: 0 | Refills: 0 | Status: COMPLETED | OUTPATIENT
Start: 2022-09-29

## 2022-09-29 RX ADMIN — MEDROXYPROGESTERONE ACETATE 0 MG/ML: 150 INJECTION, SUSPENSION INTRAMUSCULAR at 00:00

## 2022-09-29 NOTE — HISTORY OF PRESENT ILLNESS
[FreeTextEntry1] : Pt here today for dmpa. Pt happy with current method. No gyn issues. [No] : Patient does not have concerns regarding sex

## 2022-10-14 ENCOUNTER — EMERGENCY (EMERGENCY)
Facility: HOSPITAL | Age: 32
LOS: 0 days | Discharge: HOME | End: 2022-10-14
Attending: STUDENT IN AN ORGANIZED HEALTH CARE EDUCATION/TRAINING PROGRAM | Admitting: STUDENT IN AN ORGANIZED HEALTH CARE EDUCATION/TRAINING PROGRAM

## 2022-10-14 VITALS
TEMPERATURE: 98 F | DIASTOLIC BLOOD PRESSURE: 80 MMHG | HEART RATE: 98 BPM | SYSTOLIC BLOOD PRESSURE: 141 MMHG | OXYGEN SATURATION: 100 % | RESPIRATION RATE: 20 BRPM | HEIGHT: 62 IN

## 2022-10-14 DIAGNOSIS — J45.909 UNSPECIFIED ASTHMA, UNCOMPLICATED: ICD-10-CM

## 2022-10-14 DIAGNOSIS — F41.9 ANXIETY DISORDER, UNSPECIFIED: ICD-10-CM

## 2022-10-14 DIAGNOSIS — R10.31 RIGHT LOWER QUADRANT PAIN: ICD-10-CM

## 2022-10-14 DIAGNOSIS — F43.10 POST-TRAUMATIC STRESS DISORDER, UNSPECIFIED: ICD-10-CM

## 2022-10-14 DIAGNOSIS — Z87.448 PERSONAL HISTORY OF OTHER DISEASES OF URINARY SYSTEM: ICD-10-CM

## 2022-10-14 DIAGNOSIS — Z91.048 OTHER NONMEDICINAL SUBSTANCE ALLERGY STATUS: ICD-10-CM

## 2022-10-14 LAB
ALBUMIN SERPL ELPH-MCNC: 4.1 G/DL — SIGNIFICANT CHANGE UP (ref 3.5–5.2)
ALP SERPL-CCNC: 64 U/L — SIGNIFICANT CHANGE UP (ref 30–115)
ALT FLD-CCNC: 7 U/L — SIGNIFICANT CHANGE UP (ref 0–41)
ANION GAP SERPL CALC-SCNC: 11 MMOL/L — SIGNIFICANT CHANGE UP (ref 7–14)
APPEARANCE UR: CLEAR — SIGNIFICANT CHANGE UP
AST SERPL-CCNC: 17 U/L — SIGNIFICANT CHANGE UP (ref 0–41)
BACTERIA # UR AUTO: NEGATIVE — SIGNIFICANT CHANGE UP
BILIRUB SERPL-MCNC: 0.3 MG/DL — SIGNIFICANT CHANGE UP (ref 0.2–1.2)
BILIRUB UR-MCNC: NEGATIVE — SIGNIFICANT CHANGE UP
BUN SERPL-MCNC: 5 MG/DL — LOW (ref 10–20)
CALCIUM SERPL-MCNC: 8.9 MG/DL — SIGNIFICANT CHANGE UP (ref 8.4–10.5)
CHLORIDE SERPL-SCNC: 105 MMOL/L — SIGNIFICANT CHANGE UP (ref 98–110)
CO2 SERPL-SCNC: 22 MMOL/L — SIGNIFICANT CHANGE UP (ref 17–32)
COLOR SPEC: COLORLESS — SIGNIFICANT CHANGE UP
CREAT SERPL-MCNC: 0.7 MG/DL — SIGNIFICANT CHANGE UP (ref 0.7–1.5)
DIFF PNL FLD: ABNORMAL
EGFR: 119 ML/MIN/1.73M2 — SIGNIFICANT CHANGE UP
EPI CELLS # UR: 10 /HPF — HIGH (ref 0–5)
GLUCOSE SERPL-MCNC: 79 MG/DL — SIGNIFICANT CHANGE UP (ref 70–99)
GLUCOSE UR QL: NEGATIVE — SIGNIFICANT CHANGE UP
HCG SERPL QL: NEGATIVE — SIGNIFICANT CHANGE UP
HCG SERPL-ACNC: <0.6 MIU/ML — SIGNIFICANT CHANGE UP
HCT VFR BLD CALC: 34.7 % — LOW (ref 37–47)
HGB BLD-MCNC: 11.3 G/DL — LOW (ref 12–16)
HYALINE CASTS # UR AUTO: 1 /LPF — SIGNIFICANT CHANGE UP (ref 0–7)
KETONES UR-MCNC: NEGATIVE — SIGNIFICANT CHANGE UP
LACTATE SERPL-SCNC: 1 MMOL/L — SIGNIFICANT CHANGE UP (ref 0.7–2)
LEUKOCYTE ESTERASE UR-ACNC: NEGATIVE — SIGNIFICANT CHANGE UP
LIDOCAIN IGE QN: 20 U/L — SIGNIFICANT CHANGE UP (ref 7–60)
MAGNESIUM SERPL-MCNC: 2.1 MG/DL — SIGNIFICANT CHANGE UP (ref 1.8–2.4)
MCHC RBC-ENTMCNC: 24.2 PG — LOW (ref 27–31)
MCHC RBC-ENTMCNC: 32.6 G/DL — SIGNIFICANT CHANGE UP (ref 32–37)
MCV RBC AUTO: 74.3 FL — LOW (ref 81–99)
NITRITE UR-MCNC: NEGATIVE — SIGNIFICANT CHANGE UP
NRBC # BLD: 0 /100 WBCS — SIGNIFICANT CHANGE UP (ref 0–0)
PH UR: 7.5 — SIGNIFICANT CHANGE UP (ref 5–8)
PLATELET # BLD AUTO: 499 K/UL — HIGH (ref 130–400)
POTASSIUM SERPL-MCNC: 4.9 MMOL/L — SIGNIFICANT CHANGE UP (ref 3.5–5)
POTASSIUM SERPL-SCNC: 4.9 MMOL/L — SIGNIFICANT CHANGE UP (ref 3.5–5)
PROT SERPL-MCNC: 7.2 G/DL — SIGNIFICANT CHANGE UP (ref 6–8)
PROT UR-MCNC: SIGNIFICANT CHANGE UP
RBC # BLD: 4.67 M/UL — SIGNIFICANT CHANGE UP (ref 4.2–5.4)
RBC # FLD: 15.2 % — HIGH (ref 11.5–14.5)
RBC CASTS # UR COMP ASSIST: 9 /HPF — HIGH (ref 0–4)
SODIUM SERPL-SCNC: 138 MMOL/L — SIGNIFICANT CHANGE UP (ref 135–146)
SP GR SPEC: >1.05 (ref 1.01–1.03)
UROBILINOGEN FLD QL: SIGNIFICANT CHANGE UP
WBC # BLD: 11.67 K/UL — HIGH (ref 4.8–10.8)
WBC # FLD AUTO: 11.67 K/UL — HIGH (ref 4.8–10.8)
WBC UR QL: 2 /HPF — SIGNIFICANT CHANGE UP (ref 0–5)

## 2022-10-14 PROCEDURE — 76830 TRANSVAGINAL US NON-OB: CPT | Mod: 26

## 2022-10-14 PROCEDURE — 99285 EMERGENCY DEPT VISIT HI MDM: CPT

## 2022-10-14 PROCEDURE — G1004: CPT

## 2022-10-14 PROCEDURE — 74177 CT ABD & PELVIS W/CONTRAST: CPT | Mod: 26,ME

## 2022-10-14 RX ORDER — SODIUM CHLORIDE 9 MG/ML
500 INJECTION, SOLUTION INTRAVENOUS
Refills: 0 | Status: DISCONTINUED | OUTPATIENT
Start: 2022-10-14 | End: 2022-10-14

## 2022-10-14 RX ORDER — MORPHINE SULFATE 50 MG/1
2 CAPSULE, EXTENDED RELEASE ORAL ONCE
Refills: 0 | Status: DISCONTINUED | OUTPATIENT
Start: 2022-10-14 | End: 2022-10-14

## 2022-10-14 NOTE — ED PROVIDER NOTE - ATTENDING CONTRIBUTION TO CARE
32-year-old female past medical history asthma PTSD overactive bladder presents with right lower quadrant pain for 2 days nausea 1 day pressure-like discomfort worse with movement.  No diarrhea constipation no STDs patient sexually active, patient has no periods as she is on Depo.  Well appearing, NAD, non toxic. NCAT PERRLA EOMI neck supple non tender normal wob cta bl rrr abdomen s right lower quadrant tenderness nd no rebound no guarding WWPx4 neuro non focal.  Rule out abdominal pathology versus ovarian pathology CT transvaginal ultrasound labs pain

## 2022-10-14 NOTE — ED PROVIDER NOTE - PROVIDER TOKENS
PROVIDER:[TOKEN:[[id number]],FOLLOWUP:[1-3 Days]],PROVIDER:[TOKEN:[[id number]],FOLLOWUP:[1-3 Days]]

## 2022-10-14 NOTE — ED PROVIDER NOTE - CLINICAL SUMMARY MEDICAL DECISION MAKING FREE TEXT BOX
labs reviewed patient feeling better pt feeling improved, tolerating PO, abdomen soft, non-tender, non-distended, no rebound, no guarding. I have discussed the discharge plan with the patient. The patient agrees with the plan, as discussed.  The patient understands Emergency Department diagnosis is a preliminary diagnosis often based on limited information and that the patient must adhere to the follow-up plan as discussed.  The patient understands that if the symptoms worsen or if prescribed medications do not have the desired/planned effect that the patient may return to the Emergency Department at any time for further evaluation and treatment.

## 2022-10-14 NOTE — ED PROVIDER NOTE - PATIENT PORTAL LINK FT
You can access the FollowMyHealth Patient Portal offered by [facility] by registering at the following website: [email or web address]. By joining [employer]’s FollowMyHealth portal, you will also be able to view your health information using other applications (apps) compatible with our system.

## 2022-10-14 NOTE — ED PROVIDER NOTE - OBJECTIVE STATEMENT
Pt is a 32YOF with PMH of asthma, anxiety, PTSD and overactive bladder who presents to the ED for RLQ pain for 2 days. Pt states the pain started on Wednesday. Pt is a 32YOF with PMH of asthma, anxiety, PTSD and overactive bladder who presents to the ED for RLQ pain for 2 days. Pt states the pain started on Wednesday and is described as a pressure like discomfort that's on/off in nature and is non radiating. No alleviating factors but is worsened by laying down on the affected side. Patient felt nauseous on Wednesday but has not vomited. No diarrhea or constipation and no blood in stool or urine.

## 2022-10-14 NOTE — ED PROVIDER NOTE - PHYSICAL EXAMINATION
General - NAD  Neuro - AAOx3  HEENT - normocephalic, no nasal or eye discharges noted  Neck - supple  Cardio - tachycardic, S1S2  Pulm - equal breath sounds b/l  GI - soft non distended, non tender to light or deep palpation in 4 quadrants  Extremities - no b/l LE edema

## 2022-10-21 RX ORDER — DARIFENACIN HYDROBROMIDE 15 MG/1
15 TABLET, EXTENDED RELEASE ORAL
Qty: 30 | Refills: 1 | Status: DISCONTINUED | COMMUNITY
Start: 2022-09-06 | End: 2022-10-21

## 2022-12-09 ENCOUNTER — RX RENEWAL (OUTPATIENT)
Age: 32
End: 2022-12-09

## 2022-12-13 ENCOUNTER — APPOINTMENT (OUTPATIENT)
Dept: UROGYNECOLOGY | Facility: CLINIC | Age: 32
End: 2022-12-13

## 2022-12-22 ENCOUNTER — APPOINTMENT (OUTPATIENT)
Dept: OBGYN | Facility: CLINIC | Age: 32
End: 2022-12-22

## 2022-12-22 ENCOUNTER — OUTPATIENT (OUTPATIENT)
Dept: OUTPATIENT SERVICES | Facility: HOSPITAL | Age: 32
LOS: 1 days | Discharge: HOME | End: 2022-12-22

## 2022-12-22 VITALS
SYSTOLIC BLOOD PRESSURE: 148 MMHG | BODY MASS INDEX: 41.96 KG/M2 | DIASTOLIC BLOOD PRESSURE: 78 MMHG | HEART RATE: 99 BPM | WEIGHT: 228 LBS | HEIGHT: 62 IN

## 2022-12-22 PROCEDURE — 99212 OFFICE O/P EST SF 10 MIN: CPT

## 2022-12-22 RX ADMIN — MEDROXYPROGESTERONE ACETATE 0 MG/ML: 150 INJECTION, SUSPENSION INTRAMUSCULAR at 00:00

## 2022-12-22 NOTE — CHIEF COMPLAINT
[facility] Nutrition Note:    RD called pt on listed phone number r/t nutrition follow up. Pt reports appetite has been good, weight has been slowing increasing, and denies nutrition-related s/s.  RD will follow prn.     JOSE Phoenix, RD, LD  Registered Dietitian   [facility]  [phone] [Follow Up] : follow up GYN visit

## 2022-12-29 ENCOUNTER — NON-APPOINTMENT (OUTPATIENT)
Age: 32
End: 2022-12-29

## 2022-12-29 LAB
CHOLEST SERPL-MCNC: 137 MG/DL
ESTIMATED AVERAGE GLUCOSE: 123 MG/DL
HBA1C MFR BLD HPLC: 5.9 %
HDLC SERPL-MCNC: 44 MG/DL
LDLC SERPL CALC-MCNC: 84 MG/DL
NONHDLC SERPL-MCNC: 93 MG/DL
TRIGL SERPL-MCNC: 46 MG/DL

## 2023-01-03 ENCOUNTER — APPOINTMENT (OUTPATIENT)
Dept: OBGYN | Facility: CLINIC | Age: 33
End: 2023-01-03

## 2023-01-03 ENCOUNTER — APPOINTMENT (OUTPATIENT)
Dept: UROGYNECOLOGY | Facility: CLINIC | Age: 33
End: 2023-01-03
Payer: MEDICAID

## 2023-01-03 VITALS
WEIGHT: 228 LBS | BODY MASS INDEX: 41.96 KG/M2 | HEART RATE: 99 BPM | HEIGHT: 62 IN | SYSTOLIC BLOOD PRESSURE: 122 MMHG | DIASTOLIC BLOOD PRESSURE: 79 MMHG

## 2023-01-03 VITALS — BODY MASS INDEX: 41.22 KG/M2 | HEIGHT: 62 IN | WEIGHT: 224 LBS

## 2023-01-03 DIAGNOSIS — N39.46 MIXED INCONTINENCE: ICD-10-CM

## 2023-01-03 PROCEDURE — 99214 OFFICE O/P EST MOD 30 MIN: CPT

## 2023-01-03 RX ORDER — ADHESIVE TAPE 3"X 2.3 YD
50 MCG TAPE, NON-MEDICATED TOPICAL
Qty: 30 | Refills: 5 | Status: COMPLETED | COMMUNITY
Start: 2018-05-02 | End: 2023-01-03

## 2023-01-03 RX ORDER — TROSPIUM CHLORIDE 60 MG/1
60 CAPSULE, EXTENDED RELEASE ORAL
Qty: 30 | Refills: 1 | Status: COMPLETED | COMMUNITY
Start: 2022-10-21 | End: 2023-01-03

## 2023-01-03 RX ORDER — ESCITALOPRAM OXALATE 5 MG/1
5 TABLET ORAL
Refills: 0 | Status: ACTIVE | COMMUNITY

## 2023-01-03 RX ORDER — NAPROXEN 500 MG/1
500 TABLET ORAL TWICE DAILY
Qty: 50 | Refills: 0 | Status: COMPLETED | COMMUNITY
Start: 2022-05-12 | End: 2023-01-03

## 2023-01-03 NOTE — COUNSELING
[FreeTextEntry1] : If you feel like you have an infection it is important for you to call our office and we will arrange testing of your urine.\par \par Please continue taking Trospium XR 60mg, Hydroxyzine 25mg and Flexeril 5mg as directed. Refills sent to your pharmacy.\par \par Please call my office if you have any issues with the cost or side effects of the medication. \par \par Schedule a 6 months follow up med check appointment with GREGG Bello.\par

## 2023-01-03 NOTE — DISCUSSION/SUMMARY
[FreeTextEntry1] : Mixed incontinence\par Patient happy with Trospium XR 60mg QD \par Refills provided. 30 days supply with 5 refills.\par Precautions reviewed.\par Will return in 6 months for follow up or earlier if she has any issues.\par \par Urinary frequency-\par Continue Hydroxyzine 25mg QHS, refills sent, 30 days, (5R)\par \par Myalgia of pelvic floor\par Continue Flexeril 5mg 1-2 tablets QHS, refills sent 30 days (5R)\par \par

## 2023-01-03 NOTE — HISTORY OF PRESENT ILLNESS
[FreeTextEntry1] : Patient is here for 6 months med check for urinary frequency and bladder pain.\par Last seen on 6/9/22 for med check.\par \par s/p trospium 20mg twice a day, would only remember to take it once a day\par Gabapentin 100mg tid for anxiety \par Flexeril 5 mg 1-2 tabs at bedtime for myalgia\par Hydroxyzine 25 mg at bedtime for urinary frequency\par S/p Trospium ER 60mg QD: ? stopped working\par Lexapro 5mg\par s/p Vesicare 5mg: blurred visition\par \par Enablex 15mg ?: pt never took it or does not remember taking it\par \par Trospium XR 60mg QD\par \par Today, patient states she is doing well with Flexeril 5mg 2 tabs QHS, Hydroxyzine 25mg QHS and she's not sure what she's taking now for urinary frequency.  Denies side effects. Patient does not feel she has an infection. Pt called the pharmacy and got a list of her current meds. She's taking Trospium XR 60mg QD for frequency. States that it helps her some days and some days she still has frequent urination. Was recently seen by GYN and had HbA1C done: 5.9, was told that she;s prediabetic. Pt was referred to nutritionist and she made an appt with a new PCP. \par \par Patient would like to continue xx mg BID.\par

## 2023-02-14 ENCOUNTER — APPOINTMENT (OUTPATIENT)
Dept: OBGYN | Facility: CLINIC | Age: 33
End: 2023-02-14

## 2023-02-14 VITALS — HEIGHT: 62 IN | BODY MASS INDEX: 40.91 KG/M2 | WEIGHT: 222.31 LBS

## 2023-02-24 NOTE — ED PROVIDER NOTE - ATTENDING CONTRIBUTION TO CARE
[FreeTextEntry1] : Documented by Vijay Childress acting as a scribe for Dr. Yousif Rodas on (02/24/2023).\par \par All medical record entries made by the Scribe were at my, Dr. Yousif Rodas's, direction and personally dictated by me on (02/24/2023). I have reviewed the chart and agree that the record accurately reflects my personal performance of the history, physical exam, assessment and plan. I have personally directed, reviewed, and agree with the discharge instructions.  See MDM

## 2023-02-26 ENCOUNTER — EMERGENCY (EMERGENCY)
Facility: HOSPITAL | Age: 33
LOS: 0 days | Discharge: HOME | End: 2023-02-26
Attending: STUDENT IN AN ORGANIZED HEALTH CARE EDUCATION/TRAINING PROGRAM
Payer: MEDICAID

## 2023-02-26 VITALS
HEART RATE: 95 BPM | TEMPERATURE: 97 F | HEIGHT: 62 IN | OXYGEN SATURATION: 100 % | WEIGHT: 222.01 LBS | DIASTOLIC BLOOD PRESSURE: 85 MMHG | SYSTOLIC BLOOD PRESSURE: 135 MMHG | RESPIRATION RATE: 16 BRPM

## 2023-02-26 DIAGNOSIS — M79.645 PAIN IN LEFT FINGER(S): ICD-10-CM

## 2023-02-26 DIAGNOSIS — Z91.048 OTHER NONMEDICINAL SUBSTANCE ALLERGY STATUS: ICD-10-CM
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
## 2023-02-26 PROCEDURE — 99283 EMERGENCY DEPT VISIT LOW MDM: CPT

## 2023-02-26 PROCEDURE — 73130 X-RAY EXAM OF HAND: CPT | Mod: 26,LT

## 2023-02-26 PROCEDURE — 99284 EMERGENCY DEPT VISIT MOD MDM: CPT

## 2023-02-26 PROCEDURE — 73130 X-RAY EXAM OF HAND: CPT | Mod: LT

## 2023-02-26 RX ORDER — IBUPROFEN 200 MG
800 TABLET ORAL ONCE
Refills: 0 | Status: COMPLETED | OUTPATIENT
Start: 2023-02-26 | End: 2023-02-26

## 2023-02-26 RX ADMIN — Medication 800 MILLIGRAM(S): at 10:23

## 2023-02-26 NOTE — ED PROVIDER NOTE - PATIENT PORTAL LINK FT
You can access the FollowMyHealth Patient Portal offered by [facility] by registering at the following website: [email or web address]. By joining [employer]’s FollowMyHealth portal, you will also be able to view your health information using other applications (apps) compatible with our system.

## 2023-02-26 NOTE — ED PROVIDER NOTE - OBJECTIVE STATEMENT
33-year-old female no history presents to ED for evaluation of left pinky pain status post smashing into couch yesterday afternoon.  States she was moving past couch when her finger got caught and was forced to the side.  States she heard a crunch and has had throbbing to the pinky since.  Denies any pain radiating up into her hand use of blood thinners or surgery to the hand in the past

## 2023-02-26 NOTE — ED ADULT NURSE NOTE - OBJECTIVE STATEMENT
Pt c.o pain to pinky finger, pt states she was cleaning when she bumped finger into couch. denies radiating pain, blood thinners

## 2023-02-26 NOTE — ED PROVIDER NOTE - NSFOLLOWUPINSTRUCTIONS_ED_ALL_ED_FT
Please follow up with orthopedics in 1-3 days     ********* Our Emergency Department Referral Coordinators will be reaching out to you in the next 24-48 hours from 9:00am to 5:00pm with a follow up appointment. Please expect a phone call from the hospital in that time frame. If you do not receive a call or if you have any questions or concerns, you can reach them at [phone] or [phone].     Jammed Finger    A jammed finger is an injury to the ligaments that support your finger bones. Ligaments are strong bands of tissue that connect bones and keep them in place. This injury happens when the ligaments are stretched beyond their normal range of motion (sprained).    CAUSES  A jammed finger is caused by a hard direct hit to the tip of your finger that pushes your finger toward your hand.     RISK FACTORS  This injury is more likely to happen if you play sports.    SYMPTOMS  Symptoms of a jammed finger include:    Pain.  Swelling.  Discoloration and bruising around the joint.  Difficulty bending or straightening the finger.  Not being able to use the finger normally.    DIAGNOSIS  A jammed finger is diagnosed with a medical history and physical exam. You may also have X-rays taken to check for a broken bone (fracture).     TREATMENT  Treatment for a jammed finger may include:    Wearing a splint.  Taping the injured finger to the fingers beside it (tasha taping).   Medicines used to treat pain.    Depending on the type of injury, you may have to do exercises after your finger has begun to heal. This helps you regain strength and mobility in the finger.     HOME CARE INSTRUCTIONS  Take medicines only as directed by your health care provider.   Apply ice to the injured area:    Put ice in a plastic bag.    Place a towel between your skin and the bag.    Leave the ice on for 20 minutes, 2–3 times per day.   Raise the injured area above the level of your heart while you are sitting or lying down.  Wear the splint or tape as directed by your health care provider. Remove it only as directed by your health care provider.   Rest your finger until your health care provider says you can move it again. Your finger may feel stiff and painful for a while.  Perform strengthening exercises as directed by your health care provider. It may help to start doing these exercises with your hand in a bowl of warm water.  Keep all follow-up visits as directed by your health care provider. This is important.    SEEK MEDICAL CARE IF:  You have pain or swelling that is getting worse.  Your finger feels cold.  Your finger looks out of place at the joint (deformity).  You still cannot extend your finger after treatment.  You have a fever.    SEEK IMMEDIATE MEDICAL CARE IF:  Even after loosening your splint, your finger:  Is very red and swollen.  Is white or blue.  Feels tingly or becomes numb.    ADDITIONAL NOTES AND INSTRUCTIONS    Please follow up with your Primary MD in 24-48 hr.  Seek immediate medical care for any new/worsening signs or symptoms.

## 2023-02-26 NOTE — ED PROVIDER NOTE - PHYSICAL EXAMINATION
VITAL SIGNS: I have reviewed nursing notes and confirm.  CONSTITUTIONAL:  in no acute distress.  SKIN: Skin exam is warm and dry, no acute rash. swelling to left 5th digit, no signs of ecchymosis, ttp of left 5th digit  HEAD: Normocephalic; atraumatic.  CARD: S1, S2 normal; no murmurs, gallops, or rubs. Regular rate and rhythm.  RESP: No wheezes, rales or rhonchi. Speaking in full sentences.   ABD: soft; non-distended; non-tender;

## 2023-02-26 NOTE — ED PROVIDER NOTE - NS ED ATTENDING STATEMENT MOD
I have seen and examined this patient and fully participated in the care of this patient as the teaching attending.  The service was shared with the ADELE.  I reviewed and verified the documentation and independently performed the documented:

## 2023-02-26 NOTE — ED PROVIDER NOTE - CLINICAL SUMMARY MEDICAL DECISION MAKING FREE TEXT BOX
.    34 y/o R handed F p/w L handed 5th digit pain s/p hitting finger on couch last night.  Exam as noted, NL sensation, cannot flex finger 2/2 pain.  Xray neg for acute displaced frx or dislocation  IMP: finger pain/ contusion.  Pt stable for dc w/ outpt f/up, and care as discussed.  Pt understands plan and signs and symptoms for ED return. DC home.     .

## 2023-03-03 ENCOUNTER — APPOINTMENT (OUTPATIENT)
Dept: INTERNAL MEDICINE | Facility: CLINIC | Age: 33
End: 2023-03-03

## 2023-03-03 ENCOUNTER — APPOINTMENT (OUTPATIENT)
Age: 33
End: 2023-03-03

## 2023-03-09 ENCOUNTER — NON-APPOINTMENT (OUTPATIENT)
Age: 33
End: 2023-03-09

## 2023-03-09 ENCOUNTER — APPOINTMENT (OUTPATIENT)
Dept: OBGYN | Facility: CLINIC | Age: 33
End: 2023-03-09
Payer: MEDICAID

## 2023-03-09 ENCOUNTER — OUTPATIENT (OUTPATIENT)
Dept: OUTPATIENT SERVICES | Facility: HOSPITAL | Age: 33
LOS: 1 days | End: 2023-03-09
Payer: MEDICAID

## 2023-03-09 VITALS — SYSTOLIC BLOOD PRESSURE: 122 MMHG | BODY MASS INDEX: 40.6 KG/M2 | WEIGHT: 222 LBS | DIASTOLIC BLOOD PRESSURE: 90 MMHG

## 2023-03-09 DIAGNOSIS — Z30.42 ENCOUNTER FOR SURVEILLANCE OF INJECTABLE CONTRACEPTIVE: ICD-10-CM

## 2023-03-09 PROCEDURE — 99213 OFFICE O/P EST LOW 20 MIN: CPT

## 2023-03-09 PROCEDURE — 90471 IMMUNIZATION ADMIN: CPT

## 2023-03-09 RX ORDER — MEDROXYPROGESTERONE ACETATE 150 MG/ML
150 INJECTION, SUSPENSION INTRAMUSCULAR
Qty: 0 | Refills: 0 | Status: COMPLETED | OUTPATIENT
Start: 2023-03-09

## 2023-03-09 RX ADMIN — MEDROXYPROGESTERONE ACETATE 0 MG/ML: 150 INJECTION, SUSPENSION INTRAMUSCULAR at 00:00

## 2023-03-09 NOTE — PLAN
[FreeTextEntry1] : DMPA 150mg IM given\par pt encouraged to take calcium and vitamin d\par pt to rv in 3 months for dmpa and annual

## 2023-03-10 ENCOUNTER — APPOINTMENT (OUTPATIENT)
Dept: INTERNAL MEDICINE | Facility: CLINIC | Age: 33
End: 2023-03-10

## 2023-03-10 ENCOUNTER — APPOINTMENT (OUTPATIENT)
Age: 33
End: 2023-03-10

## 2023-03-15 DIAGNOSIS — Z30.42 ENCOUNTER FOR SURVEILLANCE OF INJECTABLE CONTRACEPTIVE: ICD-10-CM

## 2023-03-16 ENCOUNTER — APPOINTMENT (OUTPATIENT)
Dept: ORTHOPEDIC SURGERY | Facility: CLINIC | Age: 33
End: 2023-03-16
Payer: MEDICAID

## 2023-03-16 VITALS — WEIGHT: 224 LBS | HEIGHT: 62 IN | BODY MASS INDEX: 41.22 KG/M2

## 2023-03-16 PROCEDURE — 99203 OFFICE O/P NEW LOW 30 MIN: CPT

## 2023-03-16 PROCEDURE — 73140 X-RAY EXAM OF FINGER(S): CPT | Mod: LT

## 2023-03-17 NOTE — HISTORY OF PRESENT ILLNESS
[de-identified] : Patient is a 33-year-old female here for evaluation of left pinky finger injury.  Patient states on 2/26/2023 she jammed her left pinky finger onto her couch.  Patient was immediate pain and went to the emergency room.  X-rays were taken and read as negative.  Patient was placed in tasha tape.  Patient states her pain has not been improving and is here today for reevaluation.

## 2023-03-17 NOTE — DATA REVIEWED
[FreeTextEntry1] : X-ray left fifth digit: Volar avulsion fracture of proximal aspect middle phalanx.  Possible small avulsion of dorsal aspect proximal aspect middle phalanx

## 2023-03-17 NOTE — PHYSICAL EXAM
[Left] : left hand [] : no instability w/varus/valgus or ant/post stressing of fingers joints [FreeTextEntry9] : Good flexion extension with stiffness of fifth digit PIPJ/DIPJ [de-identified] : Concern 0 pinching 0 good strength throughoutGood strength throughout

## 2023-03-17 NOTE — DISCUSSION/SUMMARY
[de-identified] : Discussed x-rays in detail with patient showing possible acute/chronic fracture of middle phalanx fifth digit.  Patient is overall doing well with stiffness in the finger, mild swelling.  Discussed with patient that the symptoms can last 6 to 8 weeks post fracture.  Rest patient's fourth and fifth digit with tasha tape.  Advised warm water Epsom salt soaks, range of motion exercises.  Advised over-the-counter anti-inflammatory medications.  Patient will follow-up in 3 to 4 weeks for reevaluation.  Call for any questions or concerns.  Patient understands agrees with plan.  Seen on supervision of Dr. card.

## 2023-03-20 ENCOUNTER — OUTPATIENT (OUTPATIENT)
Dept: OUTPATIENT SERVICES | Facility: HOSPITAL | Age: 33
LOS: 1 days | End: 2023-03-20
Payer: COMMERCIAL

## 2023-03-20 DIAGNOSIS — K02.9 DENTAL CARIES, UNSPECIFIED: ICD-10-CM

## 2023-03-20 PROCEDURE — D0330: CPT

## 2023-03-20 PROCEDURE — D0140: CPT

## 2023-03-21 DIAGNOSIS — G50.1 ATYPICAL FACIAL PAIN: ICD-10-CM

## 2023-04-06 ENCOUNTER — APPOINTMENT (OUTPATIENT)
Dept: ORTHOPEDIC SURGERY | Facility: CLINIC | Age: 33
End: 2023-04-06
Payer: MEDICAID

## 2023-04-06 VITALS — BODY MASS INDEX: 41.22 KG/M2 | HEIGHT: 62 IN | WEIGHT: 224 LBS

## 2023-04-06 PROCEDURE — 99213 OFFICE O/P EST LOW 20 MIN: CPT

## 2023-04-06 NOTE — DISCUSSION/SUMMARY
[de-identified] : Patient is about 6 weeks status post her injury.\par She understand swelling of the joint can last for several weeks to several months.  Any swelling after 6 months may be permanent.\par She will start her physical therapy and work on range of motion and then follow-up with Dr. Rivera in about 4 to 6 weeks for repeat evaluation.  All questions were answered today.  She will contact the office with any questions or concerns.

## 2023-04-06 NOTE — HISTORY OF PRESENT ILLNESS
[de-identified] : Patient is a 33-year-old female here for repeat evaluation of her left pinky finger.  She is almost 6 weeks status post her injury.  She still has continued pain and swelling.  She went for an evaluation for therapy.  She is starting next week but they did give her some exercises she can do on her own.

## 2023-04-06 NOTE — PHYSICAL EXAM
[de-identified] : Physical exam of her left pinky finger: She has some swelling over the PIP joint of the pinky finger.  Nontender over the MCP joint.  Pain over the PIP joint.  Nontender over the DIP joint.  She can almost make a full fist.  She can flex and extend at the MCP, PIP, and DIP joint of all 10 digits.  Sensory and motor are intact.

## 2023-04-18 ENCOUNTER — APPOINTMENT (OUTPATIENT)
Dept: OBGYN | Facility: CLINIC | Age: 33
End: 2023-04-18

## 2023-04-18 VITALS — HEIGHT: 62 IN | WEIGHT: 225.31 LBS | BODY MASS INDEX: 41.46 KG/M2

## 2023-05-09 ENCOUNTER — APPOINTMENT (OUTPATIENT)
Dept: ORTHOPEDIC SURGERY | Facility: CLINIC | Age: 33
End: 2023-05-09
Payer: MEDICAID

## 2023-05-09 ENCOUNTER — APPOINTMENT (OUTPATIENT)
Dept: ORTHOPEDIC SURGERY | Facility: CLINIC | Age: 33
End: 2023-05-09

## 2023-05-09 PROCEDURE — 73140 X-RAY EXAM OF FINGER(S): CPT | Mod: LT

## 2023-05-09 PROCEDURE — 99214 OFFICE O/P EST MOD 30 MIN: CPT

## 2023-05-09 NOTE — ASSESSMENT
[FreeTextEntry1] : Patient comes in after injuring her left small finger.  She is doing relatively well.  She is in therapy.  She says she is doing better than prior.  She still has a bump of the finger.  She still cannot straighten the finger all the way.  She is able to make a fist but still cannot bend the finger all the way.\par \par Left hand: Flexion deformity PIP joint of 20 degrees, stiff, able to make a fist however decreased range of motion of DIP joint, neurovascular intact\par \par X-rays show healed fracture\par \par Status post middle phalanx fracture.  Patient is doing a lot better.  She is going to work on range of motion.  She is also can work on straightening the finger.  She is currently in therapy.  I believe she would benefit from more therapy.  I discussed this thoroughly with the patient.  She will follow-up in about 6 weeks for reevaluation.  She may benefit from an LMB splint in the future.

## 2023-05-24 ENCOUNTER — LABORATORY RESULT (OUTPATIENT)
Age: 33
End: 2023-05-24

## 2023-05-25 ENCOUNTER — OUTPATIENT (OUTPATIENT)
Dept: OUTPATIENT SERVICES | Facility: HOSPITAL | Age: 33
LOS: 1 days | End: 2023-05-25
Payer: MEDICAID

## 2023-05-25 ENCOUNTER — LABORATORY RESULT (OUTPATIENT)
Age: 33
End: 2023-05-25

## 2023-05-25 ENCOUNTER — APPOINTMENT (OUTPATIENT)
Dept: OBGYN | Facility: CLINIC | Age: 33
End: 2023-05-25
Payer: MEDICAID

## 2023-05-25 VITALS — SYSTOLIC BLOOD PRESSURE: 120 MMHG | DIASTOLIC BLOOD PRESSURE: 84 MMHG | BODY MASS INDEX: 41.7 KG/M2 | WEIGHT: 228 LBS

## 2023-05-25 DIAGNOSIS — Z30.42 ENCOUNTER FOR SURVEILLANCE OF INJECTABLE CONTRACEPTIVE: ICD-10-CM

## 2023-05-25 DIAGNOSIS — Z01.419 ENCOUNTER FOR GYNECOLOGICAL EXAMINATION (GENERAL) (ROUTINE) W/OUT ABNORMAL FINDINGS: ICD-10-CM

## 2023-05-25 PROCEDURE — 88141 CYTOPATH C/V INTERPRET: CPT

## 2023-05-25 PROCEDURE — 87591 N.GONORRHOEAE DNA AMP PROB: CPT

## 2023-05-25 PROCEDURE — 96372 THER/PROPH/DIAG INJ SC/IM: CPT

## 2023-05-25 PROCEDURE — 87624 HPV HI-RISK TYP POOLED RSLT: CPT

## 2023-05-25 PROCEDURE — 87389 HIV-1 AG W/HIV-1&-2 AB AG IA: CPT

## 2023-05-25 PROCEDURE — 87491 CHLMYD TRACH DNA AMP PROBE: CPT

## 2023-05-25 PROCEDURE — 88142 CYTOPATH C/V THIN LAYER: CPT

## 2023-05-25 PROCEDURE — 87340 HEPATITIS B SURFACE AG IA: CPT

## 2023-05-25 PROCEDURE — 99395 PREV VISIT EST AGE 18-39: CPT

## 2023-05-25 PROCEDURE — 86780 TREPONEMA PALLIDUM: CPT

## 2023-05-25 PROCEDURE — 87661 TRICHOMONAS VAGINALIS AMPLIF: CPT

## 2023-05-25 PROCEDURE — 86803 HEPATITIS C AB TEST: CPT

## 2023-05-25 RX ORDER — MEDROXYPROGESTERONE ACETATE 150 MG/ML
150 INJECTION, SUSPENSION INTRAMUSCULAR
Qty: 0 | Refills: 0 | Status: COMPLETED | OUTPATIENT
Start: 2023-05-25

## 2023-05-25 RX ADMIN — MEDROXYPROGESTERONE ACETATE 0 MG/ML: 150 INJECTION, SUSPENSION INTRAMUSCULAR at 00:00

## 2023-05-25 NOTE — PHYSICAL EXAM
[Chaperone Present] : A chaperone was present in the examining room during all aspects of the physical examination [Appropriately responsive] : appropriately responsive [No Acute Distress] : no acute distress [Alert] : alert [Soft] : soft [Non-tender] : non-tender [Non-distended] : non-distended [Oriented x3] : oriented x3 [Examination Of The Breasts] : a normal appearance [No Masses] : no breast masses were palpable [Labia Majora] : normal [Labia Minora] : normal [Normal] : normal [Uterine Adnexae] : normal

## 2023-05-25 NOTE — PLAN
[FreeTextEntry1] : Routine gyn\par pap/hpv\par gc/chlam/trich\par sti blood work\par dmpa given\par primary care with pmd\par pt to rv in 3 months or prn

## 2023-05-26 LAB
HBV SURFACE AG SER QL: NONREACTIVE
HCV AB SER QL: NONREACTIVE
HCV S/CO RATIO: 0.12 S/CO
HIV1+2 AB SPEC QL IA.RAPID: NONREACTIVE
T PALLIDUM AB SER QL IA: NEGATIVE

## 2023-05-27 LAB
C TRACH RRNA SPEC QL NAA+PROBE: NOT DETECTED
N GONORRHOEA RRNA SPEC QL NAA+PROBE: NOT DETECTED
SOURCE AMPLIFICATION: NORMAL
SOURCE AMPLIFICATION: NORMAL
T VAGINALIS RRNA SPEC QL NAA+PROBE: NOT DETECTED

## 2023-05-30 DIAGNOSIS — Z01.419 ENCOUNTER FOR GYNECOLOGICAL EXAMINATION (GENERAL) (ROUTINE) WITHOUT ABNORMAL FINDINGS: ICD-10-CM

## 2023-05-30 DIAGNOSIS — Z30.42 ENCOUNTER FOR SURVEILLANCE OF INJECTABLE CONTRACEPTIVE: ICD-10-CM

## 2023-06-05 ENCOUNTER — OUTPATIENT (OUTPATIENT)
Dept: OUTPATIENT SERVICES | Facility: HOSPITAL | Age: 33
LOS: 1 days | End: 2023-06-05
Payer: MEDICAID

## 2023-06-05 ENCOUNTER — NON-APPOINTMENT (OUTPATIENT)
Age: 33
End: 2023-06-05

## 2023-06-05 LAB — CYTOLOGY CVX/VAG DOC THIN PREP: ABNORMAL

## 2023-06-05 PROCEDURE — 87624 HPV HI-RISK TYP POOLED RSLT: CPT

## 2023-06-06 ENCOUNTER — NON-APPOINTMENT (OUTPATIENT)
Age: 33
End: 2023-06-06

## 2023-06-30 NOTE — ED PROVIDER NOTE - NS ED MD DISPO DISCHARGE CCDA
Patient/Caregiver provided printed discharge information.
Blunted

## 2023-07-05 ENCOUNTER — RX RENEWAL (OUTPATIENT)
Age: 33
End: 2023-07-05

## 2023-07-11 ENCOUNTER — APPOINTMENT (OUTPATIENT)
Dept: UROGYNECOLOGY | Facility: CLINIC | Age: 33
End: 2023-07-11

## 2023-07-13 NOTE — ED ADULT NURSE NOTE - DRUG PRE-SCREENING (DAST -1)
Detail Level: Generalized
Quality 226: Preventive Care And Screening: Tobacco Use: Screening And Cessation Intervention: Patient screened for tobacco use and is an ex/non-smoker
Quality 111:Pneumonia Vaccination Status For Older Adults: Pneumococcal vaccine (PPSV23) administered on or after patient’s 60th birthday and before the end of the measurement period
Quality 130: Documentation Of Current Medications In The Medical Record: Current Medications Documented
Quality 110: Preventive Care And Screening: Influenza Immunization: Influenza Immunization Administered during Influenza season
Statement Selected

## 2023-07-18 ENCOUNTER — APPOINTMENT (OUTPATIENT)
Dept: OBGYN | Facility: CLINIC | Age: 33
End: 2023-07-18

## 2023-07-19 ENCOUNTER — APPOINTMENT (OUTPATIENT)
Dept: OBGYN | Facility: CLINIC | Age: 33
End: 2023-07-19
Payer: MEDICAID

## 2023-07-19 ENCOUNTER — OUTPATIENT (OUTPATIENT)
Dept: OUTPATIENT SERVICES | Facility: HOSPITAL | Age: 33
LOS: 1 days | End: 2023-07-19
Payer: MEDICAID

## 2023-07-19 VITALS
HEIGHT: 62 IN | DIASTOLIC BLOOD PRESSURE: 80 MMHG | BODY MASS INDEX: 42.14 KG/M2 | SYSTOLIC BLOOD PRESSURE: 110 MMHG | WEIGHT: 229 LBS

## 2023-07-19 DIAGNOSIS — R87.612 LOW GRADE SQUAMOUS INTRAEPITHELIAL LESION ON CYTOLOGIC SMEAR OF CERVIX (LGSIL): ICD-10-CM

## 2023-07-19 DIAGNOSIS — R87.810 CERVICAL HIGH RISK HUMAN PAPILLOMAVIRUS (HPV) DNA TEST POSITIVE: ICD-10-CM

## 2023-07-19 PROCEDURE — 99213 OFFICE O/P EST LOW 20 MIN: CPT

## 2023-07-19 PROCEDURE — 88305 TISSUE EXAM BY PATHOLOGIST: CPT

## 2023-07-19 PROCEDURE — 99213 OFFICE O/P EST LOW 20 MIN: CPT | Mod: 25

## 2023-07-19 PROCEDURE — 57454 BX/CURETT OF CERVIX W/SCOPE: CPT

## 2023-07-19 NOTE — HISTORY OF PRESENT ILLNESS
[FreeTextEntry1] : 32yo here for colposcopy\par \par Previous tests:\par 11/9/18: PAP - NILM\par 11/3/21: PAP - NILM\par 5/25/23: PAP - LSIL, HPV neg

## 2023-07-19 NOTE — PROCEDURE
[Colposcopy] : Colposcopy  [Time out performed] : Pre-procedure time out performed.  Patient's name, date of birth and procedure confirmed. [Consent Obtained] : Consent obtained [Risks] : risks [Benefits] : benefits [Alternatives] : alternatives [Patient] : patient [Infection] : infection [Bleeding] : bleeding [LGSIL] : LGSIL [No Premedication] : no premedication [Colposcopy Adequate] : colposcopy adequate [SCI Fully Visualized] : SCI fully visualized [ECC Performed] : ECC performed [Biopsy] : biopsy taken [Hemostasis Obtained] : Hemostasis obtained [Tolerated Well] : the patient tolerated the procedure well [Pap Performed] : pap not performed [de-identified] : 2 [de-identified] : acetowhite changes between 5-7 oclock [de-identified] : 5 oclock, 7 oclock, ECC [de-identified] : adequate colposcopy\par low grade impression

## 2023-07-21 ENCOUNTER — APPOINTMENT (OUTPATIENT)
Dept: ORTHOPEDIC SURGERY | Facility: CLINIC | Age: 33
End: 2023-07-21
Payer: MEDICAID

## 2023-07-21 DIAGNOSIS — S62.657A NONDISPLACED FX MID PHALANX OF LT LITTLE FINGER,INITIAL ENC FOR CLOSED FX: ICD-10-CM

## 2023-07-21 DIAGNOSIS — R87.612 LOW GRADE SQUAMOUS INTRAEPITHELIAL LESION ON CYTOLOGIC SMEAR OF CERVIX (LGSIL): ICD-10-CM

## 2023-07-21 PROCEDURE — 99213 OFFICE O/P EST LOW 20 MIN: CPT

## 2023-07-21 NOTE — ASSESSMENT
[FreeTextEntry1] : Patient comes in after injuring her left small finger.  She is doing relatively well.  She is in therapy.  She says she is doing better than prior.  She still has a bump of the finger.  She still cannot straighten the finger all the way.  She is able to make a fist but still cannot bend the finger all the way.\par \par Left hand: Flexion deformity PIP joint of 20 degrees, stiff, able to make a fist however decreased range of motion of DIP joint, neurovascular intact\par \par X-rays show healed fracture\par \par Status post middle phalanx fracture.  Patient is doing a lot better.  She is going to work on range of motion.  She is also can work on straightening the finger.  She is currently in therapy.  I believe she would benefit from more therapy.  I discussed this thoroughly with the patient.  She will follow-up in 6 weeks after some therapy to check back in and see how she is doing.

## 2023-07-25 ENCOUNTER — APPOINTMENT (OUTPATIENT)
Dept: UROGYNECOLOGY | Facility: CLINIC | Age: 33
End: 2023-07-25
Payer: MEDICAID

## 2023-07-25 ENCOUNTER — NON-APPOINTMENT (OUTPATIENT)
Age: 33
End: 2023-07-25

## 2023-07-25 VITALS
DIASTOLIC BLOOD PRESSURE: 74 MMHG | HEART RATE: 96 BPM | BODY MASS INDEX: 42.14 KG/M2 | SYSTOLIC BLOOD PRESSURE: 108 MMHG | WEIGHT: 229 LBS | HEIGHT: 62 IN

## 2023-07-25 PROCEDURE — 99214 OFFICE O/P EST MOD 30 MIN: CPT

## 2023-07-25 NOTE — HISTORY OF PRESENT ILLNESS
[FreeTextEntry1] : Patient is here for 6 months med check for mixed incontinence, urinary frequency and bladder pain. \par Last seen on 1/3/23 for med check.\par \par s/p trospium 20mg twice a day, would only remember to take it once a day\par Gabapentin 100mg tid for anxiety \par Flexeril 5 mg 1-2 tabs at bedtime for myalgia\par Hydroxyzine 25 mg at bedtime for urinary frequency\par S/p Trospium ER 60mg QD: ? stopped working\par Lexapro 5mg\par s/p Vesicare 5mg: blurred visition\par \par s/p Enablex 15mg ?: pt never took it or does not remember taking it\par \par Trospium XR 60mg QD (after speaking to the pharmacist today, last refilled 10/22) \par \par Today, patient states she is happy with Flexeril 5mg 2 tabs at bedtime and Hydroxyzine 25mg at bedtime. Pt is not sure which medication she's taking for frequency, but it's helping her well. Will call her pharmacy.  Denies side effects. Patient does not feel she has an infection. Pt c/o urgency to urinate after intercourse. States that it has been this way since she first had sex many years ago, has the same partner for 8 years. States that the feeling or urgency resolves within a day. \par \par Patient would like to continue all her current medications. \par

## 2023-07-25 NOTE — COUNSELING
[FreeTextEntry1] : If you feel like you have an infection it is important for you to call our office and we will arrange testing of your urine.\par \par Please continue taking Flexeril 5 mg, Hydroxyzine 25 mg for bladder pain. Refills sent to your pharmacy.\par \par Please start taking Trospium XR 60 mg for frequency. Refills sent to your pharmacy.\par \par Please call my office if you have any issues with the cost or side effects of the medication. \par \par Schedule a 6 months follow up med check appointment with GREGG Bello.\par

## 2023-07-25 NOTE — DISCUSSION/SUMMARY
[FreeTextEntry1] : Called pt's pharmacy, per pharmacist, pt is taking Trospium XR 60mg QD, but has not had refills since October 2022. \par \par Mixed incontinence\par Rx: Trospium XR 60mg QD \par Refills provided. 30 days supply with 5 refills.\par Precautions reviewed.\par Will return in 6 months for follow up or earlier if she has any issues.\par \par Urinary frequency-\par Continue Hydroxyzine 25mg QHS, refills sent, 30 days, (5R)\par \par Myalgia of pelvic floor\par Continue Flexeril 5mg 1-2 tablets QHS, refills sent 30 days (5R)\par \par

## 2023-07-28 LAB — CORE LAB BIOPSY: NORMAL

## 2023-08-10 ENCOUNTER — APPOINTMENT (OUTPATIENT)
Dept: OBGYN | Facility: CLINIC | Age: 33
End: 2023-08-10
Payer: MEDICAID

## 2023-08-10 ENCOUNTER — OUTPATIENT (OUTPATIENT)
Dept: OUTPATIENT SERVICES | Facility: HOSPITAL | Age: 33
LOS: 1 days | End: 2023-08-10
Payer: MEDICAID

## 2023-08-10 VITALS — SYSTOLIC BLOOD PRESSURE: 121 MMHG | WEIGHT: 230 LBS | BODY MASS INDEX: 42.07 KG/M2 | DIASTOLIC BLOOD PRESSURE: 76 MMHG

## 2023-08-10 DIAGNOSIS — Z30.42 ENCOUNTER FOR SURVEILLANCE OF INJECTABLE CONTRACEPTIVE: ICD-10-CM

## 2023-08-10 PROCEDURE — 96372 THER/PROPH/DIAG INJ SC/IM: CPT

## 2023-08-10 PROCEDURE — 90471 IMMUNIZATION ADMIN: CPT

## 2023-08-10 PROCEDURE — 99213 OFFICE O/P EST LOW 20 MIN: CPT

## 2023-08-10 RX ORDER — MEDROXYPROGESTERONE ACETATE 150 MG/ML
150 INJECTION, SUSPENSION INTRAMUSCULAR
Qty: 0 | Refills: 0 | Status: COMPLETED | OUTPATIENT
Start: 2023-08-10

## 2023-08-10 RX ADMIN — MEDROXYPROGESTERONE ACETATE 0 MG/ML: 150 INJECTION, SUSPENSION INTRAMUSCULAR at 00:00

## 2023-08-10 NOTE — HISTORY OF PRESENT ILLNESS
[FreeTextEntry1] : Pt here today for dmpa. Pt happy on current method. Aware of colpo results. Working with dietician to lose weight [No] : Patient does not have concerns regarding sex

## 2023-08-11 DIAGNOSIS — Z30.42 ENCOUNTER FOR SURVEILLANCE OF INJECTABLE CONTRACEPTIVE: ICD-10-CM

## 2023-09-01 ENCOUNTER — APPOINTMENT (OUTPATIENT)
Dept: ORTHOPEDIC SURGERY | Facility: CLINIC | Age: 33
End: 2023-09-01
Payer: MEDICAID

## 2023-09-01 DIAGNOSIS — S62.628A DISPLACED FX OF MID PHALANX OF OTHER FINGER, INITIAL ENCOUNTER FOR CLOSED FX: ICD-10-CM

## 2023-09-01 PROCEDURE — 99213 OFFICE O/P EST LOW 20 MIN: CPT

## 2023-09-19 DIAGNOSIS — Z30.42 ENCOUNTER FOR SURVEILLANCE OF INJECTABLE CONTRACEPTIVE: ICD-10-CM

## 2023-09-20 DIAGNOSIS — Z30.42 ENCOUNTER FOR SURVEILLANCE OF INJECTABLE CONTRACEPTIVE: ICD-10-CM

## 2023-10-26 ENCOUNTER — OUTPATIENT (OUTPATIENT)
Dept: OUTPATIENT SERVICES | Facility: HOSPITAL | Age: 33
LOS: 1 days | End: 2023-10-26
Payer: MEDICAID

## 2023-10-26 ENCOUNTER — APPOINTMENT (OUTPATIENT)
Dept: OBGYN | Facility: CLINIC | Age: 33
End: 2023-10-26
Payer: MEDICAID

## 2023-10-26 VITALS — BODY MASS INDEX: 42.62 KG/M2 | WEIGHT: 233 LBS | SYSTOLIC BLOOD PRESSURE: 130 MMHG | DIASTOLIC BLOOD PRESSURE: 84 MMHG

## 2023-10-26 DIAGNOSIS — Z30.42 ENCOUNTER FOR SURVEILLANCE OF INJECTABLE CONTRACEPTIVE: ICD-10-CM

## 2023-10-26 PROCEDURE — 90471 IMMUNIZATION ADMIN: CPT

## 2023-10-26 PROCEDURE — 99212 OFFICE O/P EST SF 10 MIN: CPT

## 2023-10-26 RX ORDER — MEDROXYPROGESTERONE ACETATE 150 MG/ML
150 INJECTION, SUSPENSION INTRAMUSCULAR
Qty: 0 | Refills: 0 | Status: COMPLETED | OUTPATIENT
Start: 2023-10-26

## 2023-10-26 RX ADMIN — MEDROXYPROGESTERONE ACETATE 0 MG/ML: 150 INJECTION, SUSPENSION INTRAMUSCULAR at 00:00

## 2023-10-27 ENCOUNTER — APPOINTMENT (OUTPATIENT)
Dept: ORTHOPEDIC SURGERY | Facility: CLINIC | Age: 33
End: 2023-10-27

## 2023-12-20 ENCOUNTER — OUTPATIENT (OUTPATIENT)
Dept: OUTPATIENT SERVICES | Facility: HOSPITAL | Age: 33
LOS: 1 days | End: 2023-12-20

## 2023-12-20 DIAGNOSIS — K01.1 IMPACTED TEETH: ICD-10-CM

## 2023-12-20 PROCEDURE — D0170: CPT

## 2024-01-02 ENCOUNTER — RX RENEWAL (OUTPATIENT)
Age: 34
End: 2024-01-02

## 2024-01-09 ENCOUNTER — APPOINTMENT (OUTPATIENT)
Dept: UROGYNECOLOGY | Facility: CLINIC | Age: 34
End: 2024-01-09

## 2024-01-11 ENCOUNTER — OUTPATIENT (OUTPATIENT)
Dept: OUTPATIENT SERVICES | Facility: HOSPITAL | Age: 34
LOS: 1 days | End: 2024-01-11
Payer: MEDICAID

## 2024-01-11 ENCOUNTER — APPOINTMENT (OUTPATIENT)
Dept: OBGYN | Facility: CLINIC | Age: 34
End: 2024-01-11
Payer: MEDICAID

## 2024-01-11 VITALS
WEIGHT: 228.04 LBS | BODY MASS INDEX: 41.97 KG/M2 | DIASTOLIC BLOOD PRESSURE: 83 MMHG | HEIGHT: 62 IN | SYSTOLIC BLOOD PRESSURE: 127 MMHG

## 2024-01-11 DIAGNOSIS — Z30.42 ENCOUNTER FOR SURVEILLANCE OF INJECTABLE CONTRACEPTIVE: ICD-10-CM

## 2024-01-11 PROCEDURE — 99212 OFFICE O/P EST SF 10 MIN: CPT

## 2024-01-11 PROCEDURE — 96372 THER/PROPH/DIAG INJ SC/IM: CPT

## 2024-01-11 RX ORDER — MEDROXYPROGESTERONE ACETATE 150 MG/ML
150 INJECTION, SUSPENSION INTRAMUSCULAR
Qty: 0 | Refills: 0 | Status: COMPLETED | OUTPATIENT
Start: 2024-01-11

## 2024-01-11 RX ADMIN — MEDROXYPROGESTERONE ACETATE 0 MG/ML: 150 INJECTION, SUSPENSION INTRAMUSCULAR at 00:00

## 2024-01-19 DIAGNOSIS — Z30.42 ENCOUNTER FOR SURVEILLANCE OF INJECTABLE CONTRACEPTIVE: ICD-10-CM

## 2024-02-15 RX ORDER — TROSPIUM CHLORIDE 60 MG/1
60 CAPSULE, EXTENDED RELEASE ORAL
Qty: 30 | Refills: 5 | Status: ACTIVE | COMMUNITY
Start: 1900-01-01 | End: 1900-01-01

## 2024-03-22 ENCOUNTER — OUTPATIENT (OUTPATIENT)
Dept: OUTPATIENT SERVICES | Facility: HOSPITAL | Age: 34
LOS: 1 days | End: 2024-03-22
Payer: COMMERCIAL

## 2024-03-22 DIAGNOSIS — Z01.20 ENCOUNTER FOR DENTAL EXAMINATION AND CLEANING WITHOUT ABNORMAL FINDINGS: ICD-10-CM

## 2024-03-22 PROCEDURE — D0230: CPT

## 2024-03-22 PROCEDURE — D0120: CPT

## 2024-03-22 PROCEDURE — D1110: CPT

## 2024-03-22 PROCEDURE — D0220: CPT

## 2024-03-25 DIAGNOSIS — Z01.21 ENCOUNTER FOR DENTAL EXAMINATION AND CLEANING WITH ABNORMAL FINDINGS: ICD-10-CM

## 2024-04-08 ENCOUNTER — OUTPATIENT (OUTPATIENT)
Dept: OUTPATIENT SERVICES | Facility: HOSPITAL | Age: 34
LOS: 1 days | End: 2024-04-08
Payer: MEDICAID

## 2024-04-08 ENCOUNTER — APPOINTMENT (OUTPATIENT)
Dept: OBGYN | Facility: CLINIC | Age: 34
End: 2024-04-08
Payer: MEDICAID

## 2024-04-08 VITALS
BODY MASS INDEX: 42.88 KG/M2 | DIASTOLIC BLOOD PRESSURE: 91 MMHG | HEIGHT: 62 IN | WEIGHT: 233 LBS | SYSTOLIC BLOOD PRESSURE: 144 MMHG

## 2024-04-08 DIAGNOSIS — Z30.42 ENCOUNTER FOR SURVEILLANCE OF INJECTABLE CONTRACEPTIVE: ICD-10-CM

## 2024-04-08 DIAGNOSIS — Z30.430 ENCOUNTER FOR INSERTION OF INTRAUTERINE CONTRACEPTIVE DEVICE: ICD-10-CM

## 2024-04-08 PROCEDURE — 99212 OFFICE O/P EST SF 10 MIN: CPT

## 2024-04-08 PROCEDURE — 96372 THER/PROPH/DIAG INJ SC/IM: CPT

## 2024-04-08 RX ORDER — MEDROXYPROGESTERONE ACETATE 150 MG/ML
150 INJECTION, SUSPENSION INTRAMUSCULAR
Qty: 0 | Refills: 0 | Status: COMPLETED | OUTPATIENT
Start: 2024-04-08

## 2024-04-08 RX ADMIN — MEDROXYPROGESTERONE ACETATE 0 MG/ML: 150 INJECTION, SUSPENSION INTRAMUSCULAR at 00:00

## 2024-04-08 NOTE — HISTORY OF PRESENT ILLNESS
[FreeTextEntry1] : Pt here today for dmpa. Pt happy with method. Would like to continue [No] : Patient does not have concerns regarding sex

## 2024-04-12 DIAGNOSIS — Z30.42 ENCOUNTER FOR SURVEILLANCE OF INJECTABLE CONTRACEPTIVE: ICD-10-CM

## 2024-05-17 NOTE — ED PROVIDER NOTE - PHYSICAL EXAMINATION
Cancer/Cardiopulmonary Arrest Gen: NAD, AOx3  Head: NCAT  HEENT: PERRL, oral mucosa moist, normal conjunctiva, oropharynx clear without exudate or erythema  Lung: CTAB, no respiratory distress, no wheezing, rales, rhonchi  CV: normal s1/s2, rrr, Normal perfusion, pulses 2+ throughout  Abd: soft, NTND, no CVA tenderness  Genitourinary: no pelvic tenderness  MSK: No edema, no visible deformities, full range of motion in all 4 extremities, no spinal tenderness   Neuro: CN II-XII grossly intact, No focal neurologic deficits, strength 5/5 BUE/BLE, steady gait   Skin: No rash   Psych: normal affect

## 2024-06-20 RX ORDER — HYDROXYZINE HYDROCHLORIDE 25 MG/1
25 TABLET ORAL
Qty: 30 | Refills: 0 | Status: ACTIVE | COMMUNITY
Start: 2019-01-04 | End: 1900-01-01

## 2024-06-20 RX ORDER — CYCLOBENZAPRINE HYDROCHLORIDE 5 MG/1
5 TABLET, FILM COATED ORAL
Qty: 60 | Refills: 0 | Status: ACTIVE | COMMUNITY
Start: 2019-01-04 | End: 1900-01-01

## 2024-06-28 ENCOUNTER — OUTPATIENT (OUTPATIENT)
Dept: OUTPATIENT SERVICES | Facility: HOSPITAL | Age: 34
LOS: 1 days | End: 2024-06-28
Payer: COMMERCIAL

## 2024-06-28 DIAGNOSIS — K02.52 DENTAL CARIES ON PIT AND FISSURE SURFACE PENETRATING INTO DENTIN: ICD-10-CM

## 2024-06-28 DIAGNOSIS — K02.9 DENTAL CARIES, UNSPECIFIED: ICD-10-CM

## 2024-06-28 PROCEDURE — D2140: CPT

## 2024-07-01 ENCOUNTER — APPOINTMENT (OUTPATIENT)
Dept: UROGYNECOLOGY | Facility: CLINIC | Age: 34
End: 2024-07-01
Payer: MEDICAID

## 2024-07-01 VITALS
HEIGHT: 62 IN | WEIGHT: 233 LBS | HEART RATE: 83 BPM | BODY MASS INDEX: 42.88 KG/M2 | DIASTOLIC BLOOD PRESSURE: 80 MMHG | SYSTOLIC BLOOD PRESSURE: 115 MMHG

## 2024-07-01 DIAGNOSIS — R35.0 FREQUENCY OF MICTURITION: ICD-10-CM

## 2024-07-01 DIAGNOSIS — M79.18 MYALGIA, OTHER SITE: ICD-10-CM

## 2024-07-01 DIAGNOSIS — N39.46 MIXED INCONTINENCE: ICD-10-CM

## 2024-07-01 PROCEDURE — 99214 OFFICE O/P EST MOD 30 MIN: CPT

## 2024-07-08 ENCOUNTER — OUTPATIENT (OUTPATIENT)
Dept: OUTPATIENT SERVICES | Facility: HOSPITAL | Age: 34
LOS: 1 days | End: 2024-07-08
Payer: MEDICAID

## 2024-07-08 ENCOUNTER — APPOINTMENT (OUTPATIENT)
Dept: OBGYN | Facility: CLINIC | Age: 34
End: 2024-07-08
Payer: MEDICAID

## 2024-07-08 VITALS
HEIGHT: 62 IN | DIASTOLIC BLOOD PRESSURE: 87 MMHG | SYSTOLIC BLOOD PRESSURE: 137 MMHG | BODY MASS INDEX: 43.34 KG/M2 | WEIGHT: 235.5 LBS

## 2024-07-08 DIAGNOSIS — Z00.00 ENCOUNTER FOR GENERAL ADULT MEDICAL EXAMINATION WITHOUT ABNORMAL FINDINGS: ICD-10-CM

## 2024-07-08 DIAGNOSIS — Z30.42 ENCOUNTER FOR SURVEILLANCE OF INJECTABLE CONTRACEPTIVE: ICD-10-CM

## 2024-07-08 PROCEDURE — 96372 THER/PROPH/DIAG INJ SC/IM: CPT

## 2024-07-08 PROCEDURE — 99212 OFFICE O/P EST SF 10 MIN: CPT

## 2024-07-08 RX ORDER — MEDROXYPROGESTERONE ACETATE 150 MG/ML
150 INJECTION, SUSPENSION INTRAMUSCULAR
Qty: 0 | Refills: 0 | Status: COMPLETED | OUTPATIENT
Start: 2024-07-08

## 2024-07-08 RX ADMIN — MEDROXYPROGESTERONE ACETATE 0 MG/ML: 150 INJECTION, SUSPENSION INTRAMUSCULAR at 00:00

## 2024-07-10 DIAGNOSIS — Z30.42 ENCOUNTER FOR SURVEILLANCE OF INJECTABLE CONTRACEPTIVE: ICD-10-CM

## 2024-08-09 ENCOUNTER — NON-APPOINTMENT (OUTPATIENT)
Age: 34
End: 2024-08-09

## 2024-09-13 ENCOUNTER — OUTPATIENT (OUTPATIENT)
Dept: OUTPATIENT SERVICES | Facility: HOSPITAL | Age: 34
LOS: 1 days | End: 2024-09-13
Payer: COMMERCIAL

## 2024-09-13 DIAGNOSIS — K02.52 DENTAL CARIES ON PIT AND FISSURE SURFACE PENETRATING INTO DENTIN: ICD-10-CM

## 2024-09-13 PROCEDURE — D2140: CPT

## 2024-09-16 DIAGNOSIS — K02.9 DENTAL CARIES, UNSPECIFIED: ICD-10-CM

## 2024-09-20 ENCOUNTER — OUTPATIENT (OUTPATIENT)
Dept: OUTPATIENT SERVICES | Facility: HOSPITAL | Age: 34
LOS: 1 days | End: 2024-09-20
Payer: COMMERCIAL

## 2024-09-20 DIAGNOSIS — K02.52 DENTAL CARIES ON PIT AND FISSURE SURFACE PENETRATING INTO DENTIN: ICD-10-CM

## 2024-09-20 PROCEDURE — D2150: CPT

## 2024-09-30 ENCOUNTER — APPOINTMENT (OUTPATIENT)
Dept: OBGYN | Facility: CLINIC | Age: 34
End: 2024-09-30
Payer: MEDICAID

## 2024-09-30 ENCOUNTER — OUTPATIENT (OUTPATIENT)
Dept: OUTPATIENT SERVICES | Facility: HOSPITAL | Age: 34
LOS: 1 days | End: 2024-09-30
Payer: MEDICAID

## 2024-09-30 VITALS
SYSTOLIC BLOOD PRESSURE: 102 MMHG | BODY MASS INDEX: 43.67 KG/M2 | DIASTOLIC BLOOD PRESSURE: 78 MMHG | WEIGHT: 237.31 LBS | HEIGHT: 62 IN

## 2024-09-30 DIAGNOSIS — R10.2 PELVIC AND PERINEAL PAIN: ICD-10-CM

## 2024-09-30 DIAGNOSIS — Z30.42 ENCOUNTER FOR SURVEILLANCE OF INJECTABLE CONTRACEPTIVE: ICD-10-CM

## 2024-09-30 DIAGNOSIS — K02.9 DENTAL CARIES, UNSPECIFIED: ICD-10-CM

## 2024-09-30 PROCEDURE — 99213 OFFICE O/P EST LOW 20 MIN: CPT

## 2024-09-30 PROCEDURE — 96372 THER/PROPH/DIAG INJ SC/IM: CPT

## 2024-09-30 RX ORDER — MEDROXYPROGESTERONE ACETATE 150 MG/ML
150 INJECTION, SUSPENSION INTRAMUSCULAR
Qty: 0 | Refills: 0 | Status: COMPLETED | OUTPATIENT
Start: 2024-09-30

## 2024-09-30 RX ADMIN — MEDROXYPROGESTERONE ACETATE 0 MG/ML: 150 INJECTION, SUSPENSION INTRAMUSCULAR at 00:00

## 2024-09-30 NOTE — PLAN
[FreeTextEntry1] : DMPA 150mg IM given pt sent for tv/us pt to rv for annual/pap/hpv and dmpa will call with abnormal results

## 2024-09-30 NOTE — HISTORY OF PRESENT ILLNESS
[FreeTextEntry1] : Pt here for dmpa Pt also due for pap/hpv Pt c/o rlq pain/pressure that comes and goes [No] : Patient does not have concerns regarding sex

## 2024-10-11 ENCOUNTER — OUTPATIENT (OUTPATIENT)
Dept: OUTPATIENT SERVICES | Facility: HOSPITAL | Age: 34
LOS: 1 days | End: 2024-10-11
Payer: COMMERCIAL

## 2024-10-11 DIAGNOSIS — K02.52 DENTAL CARIES ON PIT AND FISSURE SURFACE PENETRATING INTO DENTIN: ICD-10-CM

## 2024-10-11 PROCEDURE — D0230: CPT

## 2024-10-11 PROCEDURE — D0120: CPT

## 2024-10-11 PROCEDURE — D0274: CPT

## 2024-10-11 PROCEDURE — D1110: CPT

## 2024-10-15 DIAGNOSIS — Z01.20 ENCOUNTER FOR DENTAL EXAMINATION AND CLEANING WITHOUT ABNORMAL FINDINGS: ICD-10-CM

## 2024-11-18 ENCOUNTER — APPOINTMENT (OUTPATIENT)
Dept: ANTEPARTUM | Facility: CLINIC | Age: 34
End: 2024-11-18
Payer: MEDICAID

## 2024-11-18 ENCOUNTER — OUTPATIENT (OUTPATIENT)
Dept: OUTPATIENT SERVICES | Facility: HOSPITAL | Age: 34
LOS: 1 days | End: 2024-11-18
Payer: MEDICAID

## 2024-11-18 ENCOUNTER — ASOB RESULT (OUTPATIENT)
Age: 34
End: 2024-11-18

## 2024-11-18 DIAGNOSIS — Z00.00 ENCOUNTER FOR GENERAL ADULT MEDICAL EXAMINATION WITHOUT ABNORMAL FINDINGS: ICD-10-CM

## 2024-11-18 PROCEDURE — 76856 US EXAM PELVIC COMPLETE: CPT | Mod: 26,59

## 2024-11-18 PROCEDURE — 76830 TRANSVAGINAL US NON-OB: CPT | Mod: 26

## 2024-11-18 PROCEDURE — 76376 3D RENDER W/INTRP POSTPROCES: CPT | Mod: 26

## 2024-11-18 PROCEDURE — 76856 US EXAM PELVIC COMPLETE: CPT

## 2024-11-18 PROCEDURE — 76830 TRANSVAGINAL US NON-OB: CPT

## 2024-11-19 DIAGNOSIS — E66.9 OBESITY, UNSPECIFIED: ICD-10-CM

## 2024-11-19 DIAGNOSIS — N85.9 NONINFLAMMATORY DISORDER OF UTERUS, UNSPECIFIED: ICD-10-CM

## 2024-11-19 DIAGNOSIS — R10.31 RIGHT LOWER QUADRANT PAIN: ICD-10-CM

## 2024-11-19 DIAGNOSIS — Z30.40 ENCOUNTER FOR SURVEILLANCE OF CONTRACEPTIVES, UNSPECIFIED: ICD-10-CM

## 2024-12-23 ENCOUNTER — LABORATORY RESULT (OUTPATIENT)
Age: 34
End: 2024-12-23

## 2024-12-23 ENCOUNTER — OUTPATIENT (OUTPATIENT)
Dept: OUTPATIENT SERVICES | Facility: HOSPITAL | Age: 34
LOS: 1 days | End: 2024-12-23
Payer: MEDICAID

## 2024-12-23 ENCOUNTER — APPOINTMENT (OUTPATIENT)
Dept: OBGYN | Facility: CLINIC | Age: 34
End: 2024-12-23
Payer: MEDICAID

## 2024-12-23 ENCOUNTER — NON-APPOINTMENT (OUTPATIENT)
Age: 34
End: 2024-12-23

## 2024-12-23 VITALS
BODY MASS INDEX: 44.08 KG/M2 | WEIGHT: 239.56 LBS | DIASTOLIC BLOOD PRESSURE: 76 MMHG | SYSTOLIC BLOOD PRESSURE: 118 MMHG | HEIGHT: 62 IN

## 2024-12-23 DIAGNOSIS — Z01.419 ENCOUNTER FOR GYNECOLOGICAL EXAMINATION (GENERAL) (ROUTINE) WITHOUT ABNORMAL FINDINGS: ICD-10-CM

## 2024-12-23 DIAGNOSIS — Z01.419 ENCOUNTER FOR GYNECOLOGICAL EXAMINATION (GENERAL) (ROUTINE) W/OUT ABNORMAL FINDINGS: ICD-10-CM

## 2024-12-23 PROCEDURE — 87624 HPV HI-RISK TYP POOLED RSLT: CPT

## 2024-12-23 PROCEDURE — 99395 PREV VISIT EST AGE 18-39: CPT

## 2024-12-23 PROCEDURE — 99459 PELVIC EXAMINATION: CPT

## 2024-12-23 PROCEDURE — 88142 CYTOPATH C/V THIN LAYER: CPT

## 2024-12-27 ENCOUNTER — OUTPATIENT (OUTPATIENT)
Dept: OUTPATIENT SERVICES | Facility: HOSPITAL | Age: 34
LOS: 1 days | End: 2024-12-27

## 2024-12-27 DIAGNOSIS — Z00.00 ENCOUNTER FOR GENERAL ADULT MEDICAL EXAMINATION WITHOUT ABNORMAL FINDINGS: ICD-10-CM

## 2024-12-28 DIAGNOSIS — Z00.00 ENCOUNTER FOR GENERAL ADULT MEDICAL EXAMINATION WITHOUT ABNORMAL FINDINGS: ICD-10-CM

## 2025-01-27 NOTE — ED ADULT NURSE NOTE - CHIEF COMPLAINT QUOTE
Flu like symptoms Spoke to patient and reviewed provider response. All questions and concerns addressed.

## 2025-02-11 NOTE — ED PROVIDER NOTE - MDM ORDERS SUBMITTED SELECTION
Discharge Plan[de-identified] Home with Office Follow-up Goals:    Food log (ie.) [email or web address].   No sugary beverages. At least 64oz of water daily.  Increase physical activity by 10 minutes daily. Gradually increase physical activity to a goal of 5 days per week for 30 minutes of MODERATE intensity PLUS 2 days per week of FULL BODY resistance training  2500 Calories per day  5-10 servings of fruits and vegetables per day  25-35 grams of dietary fiber per day   Imaging Studies/Medications

## 2025-02-12 ENCOUNTER — RX RENEWAL (OUTPATIENT)
Age: 35
End: 2025-02-12

## 2025-04-14 ENCOUNTER — OUTPATIENT (OUTPATIENT)
Dept: OUTPATIENT SERVICES | Facility: HOSPITAL | Age: 35
LOS: 1 days | End: 2025-04-14
Payer: COMMERCIAL

## 2025-04-14 DIAGNOSIS — Z01.20 ENCOUNTER FOR DENTAL EXAMINATION AND CLEANING WITHOUT ABNORMAL FINDINGS: ICD-10-CM

## 2025-04-14 PROCEDURE — D1110: CPT

## 2025-04-14 PROCEDURE — D0120: CPT

## 2025-04-15 DIAGNOSIS — Z01.20 ENCOUNTER FOR DENTAL EXAMINATION AND CLEANING WITHOUT ABNORMAL FINDINGS: ICD-10-CM

## 2025-07-09 ENCOUNTER — APPOINTMENT (OUTPATIENT)
Dept: UROGYNECOLOGY | Facility: CLINIC | Age: 35
End: 2025-07-09
Payer: COMMERCIAL

## 2025-07-09 VITALS
BODY MASS INDEX: 46.01 KG/M2 | SYSTOLIC BLOOD PRESSURE: 104 MMHG | HEART RATE: 106 BPM | WEIGHT: 250 LBS | HEIGHT: 62 IN | DIASTOLIC BLOOD PRESSURE: 71 MMHG

## 2025-07-09 PROCEDURE — 99214 OFFICE O/P EST MOD 30 MIN: CPT

## 2025-07-09 PROCEDURE — G2211 COMPLEX E/M VISIT ADD ON: CPT | Mod: NC
